# Patient Record
Sex: MALE | Race: WHITE | Employment: OTHER | URBAN - METROPOLITAN AREA
[De-identification: names, ages, dates, MRNs, and addresses within clinical notes are randomized per-mention and may not be internally consistent; named-entity substitution may affect disease eponyms.]

---

## 2018-02-05 PROBLEM — R07.89 CHEST TIGHTNESS: Status: ACTIVE | Noted: 2018-02-05

## 2019-03-07 ENCOUNTER — HOSPITAL ENCOUNTER (OUTPATIENT)
Age: 74
Setting detail: OBSERVATION
Discharge: HOME | End: 2019-03-07
Attending: EMERGENCY MEDICINE | Admitting: INTERNAL MEDICINE
Payer: MEDICARE

## 2019-03-07 ENCOUNTER — APPOINTMENT (OUTPATIENT)
Dept: GENERAL RADIOLOGY | Age: 74
End: 2019-03-07
Attending: EMERGENCY MEDICINE
Payer: MEDICARE

## 2019-03-07 VITALS
SYSTOLIC BLOOD PRESSURE: 132 MMHG | DIASTOLIC BLOOD PRESSURE: 67 MMHG | RESPIRATION RATE: 16 BRPM | HEIGHT: 71 IN | HEART RATE: 59 BPM | WEIGHT: 220 LBS | BODY MASS INDEX: 30.8 KG/M2 | OXYGEN SATURATION: 95 %

## 2019-03-07 DIAGNOSIS — R07.9 ACUTE CHEST PAIN: Primary | ICD-10-CM

## 2019-03-07 LAB
ALBUMIN SERPL-MCNC: 4 G/DL (ref 3.2–4.6)
ALBUMIN/GLOB SERPL: 1.1 {RATIO} (ref 1.2–3.5)
ALP SERPL-CCNC: 84 U/L (ref 50–136)
ALT SERPL-CCNC: 35 U/L (ref 12–65)
ANION GAP SERPL CALC-SCNC: 5 MMOL/L (ref 7–16)
AST SERPL-CCNC: 20 U/L (ref 15–37)
ATRIAL RATE: 59 BPM
BASOPHILS # BLD: 0 K/UL (ref 0–0.2)
BASOPHILS NFR BLD: 0 % (ref 0–2)
BILIRUB SERPL-MCNC: 1.8 MG/DL (ref 0.2–1.1)
BUN SERPL-MCNC: 14 MG/DL (ref 8–23)
CALCIUM SERPL-MCNC: 8.7 MG/DL (ref 8.3–10.4)
CALCULATED P AXIS, ECG09: 23 DEGREES
CALCULATED R AXIS, ECG10: -12 DEGREES
CALCULATED T AXIS, ECG11: 64 DEGREES
CHLORIDE SERPL-SCNC: 105 MMOL/L (ref 98–107)
CO2 SERPL-SCNC: 31 MMOL/L (ref 21–32)
CREAT SERPL-MCNC: 0.95 MG/DL (ref 0.8–1.5)
DIAGNOSIS, 93000: NORMAL
DIFFERENTIAL METHOD BLD: ABNORMAL
EOSINOPHIL # BLD: 0.2 K/UL (ref 0–0.8)
EOSINOPHIL NFR BLD: 3 % (ref 0.5–7.8)
ERYTHROCYTE [DISTWIDTH] IN BLOOD BY AUTOMATED COUNT: 12.1 % (ref 11.9–14.6)
GLOBULIN SER CALC-MCNC: 3.5 G/DL (ref 2.3–3.5)
GLUCOSE SERPL-MCNC: 116 MG/DL (ref 65–100)
HCT VFR BLD AUTO: 40.7 % (ref 41.1–50.3)
HGB BLD-MCNC: 14.5 G/DL (ref 13.6–17.2)
IMM GRANULOCYTES # BLD AUTO: 0 K/UL (ref 0–0.5)
IMM GRANULOCYTES NFR BLD AUTO: 0 % (ref 0–5)
LYMPHOCYTES # BLD: 1.1 K/UL (ref 0.5–4.6)
LYMPHOCYTES NFR BLD: 21 % (ref 13–44)
MCH RBC QN AUTO: 32.2 PG (ref 26.1–32.9)
MCHC RBC AUTO-ENTMCNC: 35.6 G/DL (ref 31.4–35)
MCV RBC AUTO: 90.4 FL (ref 79.6–97.8)
MONOCYTES # BLD: 0.5 K/UL (ref 0.1–1.3)
MONOCYTES NFR BLD: 10 % (ref 4–12)
NEUTS SEG # BLD: 3.5 K/UL (ref 1.7–8.2)
NEUTS SEG NFR BLD: 66 % (ref 43–78)
NRBC # BLD: 0 K/UL (ref 0–0.2)
P-R INTERVAL, ECG05: 140 MS
PLATELET # BLD AUTO: 148 K/UL (ref 150–450)
PMV BLD AUTO: 10.8 FL (ref 9.4–12.3)
POTASSIUM SERPL-SCNC: 3.5 MMOL/L (ref 3.5–5.1)
PROT SERPL-MCNC: 7.5 G/DL (ref 6.3–8.2)
Q-T INTERVAL, ECG07: 456 MS
QRS DURATION, ECG06: 98 MS
QTC CALCULATION (BEZET), ECG08: 451 MS
RBC # BLD AUTO: 4.5 M/UL (ref 4.23–5.6)
SODIUM SERPL-SCNC: 141 MMOL/L (ref 136–145)
TROPONIN I BLD-MCNC: 0.03 NG/ML (ref 0.02–0.05)
VENTRICULAR RATE, ECG03: 59 BPM
WBC # BLD AUTO: 5.4 K/UL (ref 4.3–11.1)

## 2019-03-07 PROCEDURE — 77030004534 HC CATH ANGI DX INFN CARD -A

## 2019-03-07 PROCEDURE — 93005 ELECTROCARDIOGRAM TRACING: CPT | Performed by: EMERGENCY MEDICINE

## 2019-03-07 PROCEDURE — 74011636320 HC RX REV CODE- 636/320: Performed by: INTERNAL MEDICINE

## 2019-03-07 PROCEDURE — 99218 HC RM OBSERVATION: CPT

## 2019-03-07 PROCEDURE — 84484 ASSAY OF TROPONIN QUANT: CPT

## 2019-03-07 PROCEDURE — 71045 X-RAY EXAM CHEST 1 VIEW: CPT

## 2019-03-07 PROCEDURE — 99285 EMERGENCY DEPT VISIT HI MDM: CPT | Performed by: EMERGENCY MEDICINE

## 2019-03-07 PROCEDURE — 74011250637 HC RX REV CODE- 250/637: Performed by: EMERGENCY MEDICINE

## 2019-03-07 PROCEDURE — 99152 MOD SED SAME PHYS/QHP 5/>YRS: CPT

## 2019-03-07 PROCEDURE — 85025 COMPLETE CBC W/AUTO DIFF WBC: CPT

## 2019-03-07 PROCEDURE — 74011250636 HC RX REV CODE- 250/636: Performed by: INTERNAL MEDICINE

## 2019-03-07 PROCEDURE — 74011250636 HC RX REV CODE- 250/636

## 2019-03-07 PROCEDURE — 93459 L HRT ART/GRFT ANGIO: CPT

## 2019-03-07 PROCEDURE — 93458 L HRT ARTERY/VENTRICLE ANGIO: CPT

## 2019-03-07 PROCEDURE — C1760 CLOSURE DEV, VASC: HCPCS

## 2019-03-07 PROCEDURE — C1769 GUIDE WIRE: HCPCS

## 2019-03-07 PROCEDURE — 80053 COMPREHEN METABOLIC PANEL: CPT

## 2019-03-07 PROCEDURE — C1894 INTRO/SHEATH, NON-LASER: HCPCS

## 2019-03-07 RX ORDER — ATORVASTATIN CALCIUM 40 MG/1
20 TABLET, FILM COATED ORAL DAILY
Status: DISCONTINUED | OUTPATIENT
Start: 2019-03-08 | End: 2019-03-07 | Stop reason: HOSPADM

## 2019-03-07 RX ORDER — SODIUM CHLORIDE 0.9 % (FLUSH) 0.9 %
5-40 SYRINGE (ML) INJECTION EVERY 8 HOURS
Status: DISCONTINUED | OUTPATIENT
Start: 2019-03-07 | End: 2019-03-07 | Stop reason: HOSPADM

## 2019-03-07 RX ORDER — SODIUM CHLORIDE 0.9 % (FLUSH) 0.9 %
5-40 SYRINGE (ML) INJECTION AS NEEDED
Status: DISCONTINUED | OUTPATIENT
Start: 2019-03-07 | End: 2019-03-07 | Stop reason: HOSPADM

## 2019-03-07 RX ORDER — MORPHINE SULFATE 2 MG/ML
2 INJECTION, SOLUTION INTRAMUSCULAR; INTRAVENOUS
Status: DISCONTINUED | OUTPATIENT
Start: 2019-03-07 | End: 2019-03-07 | Stop reason: HOSPADM

## 2019-03-07 RX ORDER — TRIAMTERENE AND HYDROCHLOROTHIAZIDE 37.5; 25 MG/1; MG/1
1 CAPSULE ORAL DAILY
Status: DISCONTINUED | OUTPATIENT
Start: 2019-03-08 | End: 2019-03-07 | Stop reason: HOSPADM

## 2019-03-07 RX ORDER — LIDOCAINE HYDROCHLORIDE 10 MG/ML
2-10 INJECTION INFILTRATION; PERINEURAL
Status: DISCONTINUED | OUTPATIENT
Start: 2019-03-07 | End: 2019-03-07 | Stop reason: HOSPADM

## 2019-03-07 RX ORDER — HEPARIN SODIUM 200 [USP'U]/100ML
2 INJECTION, SOLUTION INTRAVENOUS CONTINUOUS
Status: DISCONTINUED | OUTPATIENT
Start: 2019-03-07 | End: 2019-03-07 | Stop reason: HOSPADM

## 2019-03-07 RX ORDER — NITROGLYCERIN 0.4 MG/1
0.4 TABLET SUBLINGUAL
Status: DISCONTINUED | OUTPATIENT
Start: 2019-03-07 | End: 2019-03-07 | Stop reason: HOSPADM

## 2019-03-07 RX ORDER — GUAIFENESIN 100 MG/5ML
81 LIQUID (ML) ORAL DAILY
Status: DISCONTINUED | OUTPATIENT
Start: 2019-03-08 | End: 2019-03-07 | Stop reason: HOSPADM

## 2019-03-07 RX ORDER — LOSARTAN POTASSIUM 25 MG/1
25 TABLET ORAL DAILY
Status: DISCONTINUED | OUTPATIENT
Start: 2019-03-07 | End: 2019-03-07 | Stop reason: HOSPADM

## 2019-03-07 RX ORDER — MIDAZOLAM HYDROCHLORIDE 1 MG/ML
.5-2 INJECTION, SOLUTION INTRAMUSCULAR; INTRAVENOUS
Status: DISCONTINUED | OUTPATIENT
Start: 2019-03-07 | End: 2019-03-07 | Stop reason: HOSPADM

## 2019-03-07 RX ORDER — RANITIDINE 300 MG/1
300 TABLET ORAL DAILY
COMMUNITY
End: 2020-10-19

## 2019-03-07 RX ORDER — CARVEDILOL 12.5 MG/1
25 TABLET ORAL 2 TIMES DAILY
Status: DISCONTINUED | OUTPATIENT
Start: 2019-03-07 | End: 2019-03-07 | Stop reason: HOSPADM

## 2019-03-07 RX ORDER — ASPIRIN 325 MG
325 TABLET ORAL ONCE
Status: COMPLETED | OUTPATIENT
Start: 2019-03-07 | End: 2019-03-07

## 2019-03-07 RX ORDER — LOSARTAN POTASSIUM 25 MG/1
25 TABLET ORAL DAILY
Qty: 30 TAB | Refills: 11 | Status: SHIPPED | OUTPATIENT
Start: 2019-03-07 | End: 2019-11-07 | Stop reason: SDUPTHER

## 2019-03-07 RX ADMIN — ASPIRIN 325 MG ORAL TABLET 325 MG: 325 PILL ORAL at 08:04

## 2019-03-07 RX ADMIN — LIDOCAINE HYDROCHLORIDE 5 ML: 10 INJECTION, SOLUTION INFILTRATION; PERINEURAL at 12:24

## 2019-03-07 RX ADMIN — MIDAZOLAM HYDROCHLORIDE 2 MG: 1 INJECTION, SOLUTION INTRAMUSCULAR; INTRAVENOUS at 12:21

## 2019-03-07 RX ADMIN — IOPAMIDOL 90 ML: 755 INJECTION, SOLUTION INTRAVENOUS at 12:56

## 2019-03-07 RX ADMIN — HEPARIN SODIUM 2 ML/HR: 5000 INJECTION, SOLUTION INTRAVENOUS; SUBCUTANEOUS at 12:10

## 2019-03-07 RX ADMIN — NITROGLYCERIN 0.4 MG: 0.4 TABLET, ORALLY DISINTEGRATING SUBLINGUAL at 08:05

## 2019-03-07 RX ADMIN — MIDAZOLAM HYDROCHLORIDE 2 MG: 1 INJECTION, SOLUTION INTRAMUSCULAR; INTRAVENOUS at 12:27

## 2019-03-07 RX ADMIN — NITROGLYCERIN 0.4 MG: 0.4 TABLET, ORALLY DISINTEGRATING SUBLINGUAL at 08:13

## 2019-03-07 NOTE — PROGRESS NOTES
Discharge instructions reviewed with patient including post cath care. INT removed with cath intact. Right groin stable no bleeding or hematoma noted. Patient states verbal understanding of instructions.

## 2019-03-07 NOTE — ED PROVIDER NOTES
Patient complains of chest tightness intermittently for the past 6 days. Exertional and at rest. Had SOB associated at times. Left arm tingling at times. No nausea or vomiting. Hurting last night. Took one Nitroglycerin and his 81 mg aspirin. Slept fitfully with tightness intermittently through the night. CABG in 2011. Last cath 2/16 with patent grafts. Last stress test 2/18 was OK. [place]. Past Medical History:   Diagnosis Date    CAD (coronary artery disease)     diagnosed in 46s    Cancer ([facility] [address]     skin - right hand    Dyslipidemia     ED (erectile dysfunction)     GERD (gastroesophageal reflux disease)     Hypertension     ibs     Skin cancer 2010    basal cell       Past Surgical History:   Procedure Laterality Date    CABG, VEIN, TWO  4/5/2011    CARDIAC SURG PROCEDURE UNLIST      cabg x 3 april 5, 2011    HX APPENDECTOMY      HX CHOLECYSTECTOMY      HX HEART CATHETERIZATION           Family History:   Problem Relation Age of Onset    Heart Disease Mother     Heart Disease Maternal Uncle     Heart Disease Maternal Grandfather     Cancer Father        Social History     Socioeconomic History    Marital status:      Spouse name: Not on file    Number of children: Not on file    Years of education: Not on file    Highest education level: Not on file   Social Needs    Financial resource strain: Not on file    Food insecurity - worry: Not on file    Food insecurity - inability: Not on file   Carbonated Content needs - medical: Not on file   Carbonated Content needs - non-medical: Not on file   Occupational History    Not on file   Tobacco Use    Smoking status: Former Smoker     Years: 2.00     Types: Pipe    Smokeless tobacco: Never Used   Substance and Sexual Activity    Alcohol use:  Yes     Alcohol/week: 0.6 oz     Types: 1 Glasses of wine per week    Drug use: Yes     Types: Prescription    Sexual activity: Not on file   Other Topics Concern     Service Not Asked    Blood Transfusions Not Asked    Caffeine Concern Not Asked    Occupational Exposure Not Asked    Hobby Hazards Not Asked    Sleep Concern Not Asked    Stress Concern Not Asked    Weight Concern Yes    Special Diet Yes    Back Care Not Asked    Exercise Not Asked    Bike Helmet Not Asked   [address] Not Asked    Self-Exams Not Asked   Social History Narrative    Lives with his wife in [facility]    PCP- Dr. Valentina Dominguez in [place]         ALLERGIES: Ace inhibitors    Review of Systems   Constitutional: Negative. HENT: Negative. Eyes: Negative. Respiratory: Positive for shortness of breath. Cardiovascular: Positive for chest pain. Negative for palpitations and leg swelling. Gastrointestinal: Negative. Genitourinary: Negative. Musculoskeletal: Negative. Skin: Negative. Neurological: Negative. Hematological: Negative. Psychiatric/Behavioral: Negative. Vitals:    03/07/19 0758 03/07/19 0800 03/07/19 0805 03/07/19 0813   BP: 181/88 (!) 186/105 (!) 186/105 157/89   Pulse: (!) 57 (!) 59 (!) 58 (!) 58   SpO2: 95% 97%              Physical Exam   Constitutional: He is oriented to person, place, and time. He appears well-developed and well-nourished. HENT:   Head: Normocephalic and atraumatic. Right Ear: External ear normal.   Left Ear: External ear normal.   Mouth/Throat: Oropharynx is clear and moist.   Eyes: Conjunctivae and EOM are normal. Pupils are equal, round, and reactive to light. No scleral icterus. Neck: Normal range of motion. Neck supple. No JVD present. Cardiovascular: Normal rate, regular rhythm, normal heart sounds and intact distal pulses. Pulmonary/Chest: Effort normal and breath sounds normal.   Abdominal: Soft. Bowel sounds are normal. He exhibits no mass. There is no tenderness. Musculoskeletal: Normal range of motion. He exhibits no edema or tenderness. Neurological: He is alert and oriented to person, place, and time.    Skin: Skin is warm and dry. Psychiatric: He has a normal mood and affect. His behavior is normal.   Nursing note and vitals reviewed. MDM  Number of Diagnoses or Management Options  Acute chest pain:   Diagnosis management comments: Chest tightness  History of CAD  Nitroglycerin 0.4 SL x 2.  Aspirin 325 mg po - improved  EKG sinus no ischemia  CXR clear  Labs reviewed  Consult Cardiology - Dr. Verenice Nunn came to see patient       Amount and/or Complexity of Data Reviewed  Clinical lab tests: ordered and reviewed  Tests in the radiology section of CPT®: ordered and reviewed  Review and summarize past medical records: yes  Independent visualization of images, tracings, or specimens: yes    Critical Care  Total time providing critical care: 30-74 minutes (Critical care time 35 minutes)    Patient Progress  Patient progress: stable         Procedures

## 2019-03-07 NOTE — H&P
[facility] CARDIOLOGY History &Physical                 Primary Cardiologist: Dr Chuy Vásquez    Primary Care Physician: Lianet Kelly MD    Admitting Physician: Dr Reggie Bonds:     Patient is a 72 y.o. male with a hx of CAD s/p CABG in 2010, CA of the skin, HTN and and ED. The patient has been in good health with a [facility] in 2016 that showed stable disease. He has had couple weeks of increased chest tightness with activity that goes away with rest.  Associated symptoms include MAJOR, SOB, and tingling down the left arm. He denies fever, trauma, ha, loc, ams, n/v/d, recent illness. When the patient arrived in the ED he was found to be very hypertensive and his pain was relived with two nitro. He has no other complainants at this time and he is currently CP free. His 12 lead ekg showed no EKG changes with Trop I from POC testing at 0.03. The patient last meal as at 8 pm last night. The patient also notes that is taking less and less effort for him to notice his CP. Past Medical History:   Diagnosis Date    CAD (coronary artery disease)     diagnosed in 46s    Cancer ([facility] [address]     skin - right hand    Dyslipidemia     ED (erectile dysfunction)     GERD (gastroesophageal reflux disease)     Hypertension     ibs     Skin cancer 2010    basal cell      Past Surgical History:   Procedure Laterality Date    CABG, VEIN, TWO  4/5/2011    CARDIAC SURG PROCEDURE UNLIST      cabg x 3 april 5, 2011    HX APPENDECTOMY      HX CHOLECYSTECTOMY      HX HEART CATHETERIZATION        Allergies   Allergen Reactions    Ace Inhibitors Other (comments)     Angioedema     Social History     Tobacco Use    Smoking status: Former Smoker     Years: 2.00     Types: Pipe    Smokeless tobacco: Never Used   Substance Use Topics    Alcohol use:  Yes     Alcohol/week: 0.6 oz     Types: 1 Glasses of wine per week      FH:   Family History   Problem Relation Age of Onset    Heart Disease Mother     Heart Disease Maternal Uncle     Heart Disease Maternal Grandfather     Cancer Father         PAIGE  @SLY@      Objective:       Visit Vitals  /89   Pulse (!) 58   SpO2 97%       No intake/output data recorded. No intake/output data recorded. Physical Exam:  General: Well Developed, Well Nourished, No Acute Distress  HEENT: pupils equal and round, no abnormalities noted  Neck: supple, no JVD, no carotid bruits  Heart: S1S2 with RRR without murmurs or gallops  Lungs: Clear throughout auscultation bilaterally without adventitious sounds  Abd: soft, nontender, nondistended, with good bowel sounds  Ext: warm, no edema, calves supple/nontender, pulses 2+ bilaterally  Skin: warm and dry  Psychiatric: Normal mood and affect  Neurologic: Alert and oriented X 3      ECG: No ST segment changes    Data Review:   Recent Labs     03/07/19  0821 03/07/19  0741   NA  --  141   K  --  3.5   BUN  --  14   CREA  --  0.95   GLU  --  116*   WBC  --  5.4   HGB  --  14.5   HCT  --  40.7*   PLT  --  148*   TNIPOC 0.03  --          Echo Results  (Last 48 hours)    None          CXR Results  (Last 48 hours)               03/07/19 0807  XR CHEST PORT Final result    Impression:  IMPRESSION:    1. Mild cardiac megaly. Narrative:  CHEST RADIOGRAPH, 1 views, 3/7/2019       History: Chest tightness, arm tingling, and chest pain. Technique: Portable frontal view of the chest.        Comparison: Chest radiograph 4/6/2011       Findings:    Stable post surgical changes overlie the mediastinal silhouette. The heart   appears mildly enlarged on this AP study which is improved from the prior study. Lungs are expanded without appreciable pneumothorax. No evolving consolidation,   or pleural effusion is seen. Assessment/Plan:   Principal Problem:    Unstable angina ([address] (3/28/2011)  Pt is NPO with significant hx and worsening symptoms.   Plan for LHC, Monitor, Trend Trop I, BMP and CBC in the am.  Continue ASA, Statin, and BB, Add ARB with HTN. Further recommendations pending clinical course.       Active Problems:    HTN (hypertension) (3/28/2011) Continue BB, and Duartetic, will add ARB      Dyslipidemia (3/28/2011) Continue Statin      S/P CABG x 2 (4/5/2011) see above      Chest pain (3/7/2019) see above            Braulio Lopez, NP  3/7/2019  10:36 AM

## 2019-03-07 NOTE — DISCHARGE INSTRUCTIONS
HEART CATHETERIZATION/ANGIOGRAPHY DISCHARGE INSTRUCTIONS    1. Check puncture site frequently for swelling or bleeding. If there is any bleeding, lie down and apply pressure over the area with a clean towel or washcloth. Notify your doctor for any redness, swelling, drainage, or oozing from the puncture site. Notify your doctor for any fever or chills. 2. If the extremity becomes cold, numb, or painful call [facility] Cardiology at [phone]  3. Activity should be limited for the next 48 hours. Climb stairs as little as possible and avoid any stooping, bending, or strenuous activity for 48 hours. No heavy lifting (anything over 10 pounds) for 3 days. 4. You may resume your usual diet. Drink more fluids than usual.  5. Have a responsible person drive you home and stay with you for at least 24 hours after your heart catheterization/angiography. 6. You may remove bandage from your {ARM/GROIN:24120} in 24 hours. You may shower in 24 hours. No tub baths, hot tubs, or swimming for 1 week. Do not place any lotions, creams, powders, or ointments over puncture site for 1 week. You may place a clean band-aid over the puncture site each day for 5 days. Change daily. I have read the above instructions and have had the opportunity to ask questions.       Patient: ________________________   Date: 3/7/2019    Witness: _______________________   Date: 3/7/2019

## 2019-03-07 NOTE — PROGRESS NOTES
TRANSFER - OUT REPORT:    Verbal report given to Michelle RN(name) on Wilder Cano  being transferred to CPRU(unit) for routine progression of care       Report consisted of patients Situation, Background, Assessment and   Recommendations(SBAR). Information from the following report(s) Procedure Summary, MAR and Cardiac Rhythm SB was reviewed with the receiving nurse. Lines:   Peripheral IV 03/07/19 Left Antecubital (Active)       Peripheral IV 03/07/19 Right Antecubital (Active)        Opportunity for questions and clarification was provided.       Patient transported with:   Registered Nurse     [facility] Dr Daniel Mendenhall only  Right femoral access- 6fr angioseal - site C/D/I  Versed 4mg IV

## 2019-03-07 NOTE — DISCHARGE SUMMARY
[facility] Cardiology Discharge Summary     Patient ID:  Los Walls  [id number]  89 y.o.  1945    Admit date: 3/7/2019    Discharge date:  03/07/19    Admitting Physician: Héctor Michael MD     Discharge Physician: Margarita Allen NP/Dr. Ana Santacruz    Admission Diagnoses: Chest pain [R07.9]    Discharge Diagnoses:   Patient Active Problem List    Diagnosis Date Noted    Chest pain 03/07/2019    Chest tightness 02/05/2018    S/P CABG x 2 04/05/2011    Unstable angina ([address] 03/28/2011    CAD (coronary artery disease), native coronary artery 03/28/2011    HTN (hypertension) 03/28/2011    Dyslipidemia 03/28/2011    ED (erectile dysfunction) 03/28/2011    IBS (irritable bowel syndrome) 03/28/2011    GERD (gastroesophageal reflux disease) 03/28/2011    Skin cancer 03/28/2011       Cardiology Procedures this admission:  Diagnostic left heart catheterization  Consults: None    Hospital Course: The patient came to the ED with couple weeks of increased chest tightness with activity that goes away with rest.  Associated symptoms include MAJOR, SOB, and tingling down the left arm. He denies fever, trauma, ha, loc, ams, n/v/d, recent illness. When the patient arrived in the ED he was found to be very hypertensive and his pain was relived with two nitro. He was subsequently scheduled for a LHC at [facility] on 03/07/19. Patient underwent cardiac catheterization by Dr. Ana Santacruz and was found to have stable CAD with recommendations for medical management. Patient tolerated the procedure well and was taken to the CPRU for recovery. The day of discharge the patient was up feeling well without any complaints of chest pain or shortness of breath. Patient's right femoral cath site was clean, dry and intact without hematoma or bruit after the use of an angioseal device. Patient's labs were WNL. Patient was seen and examined by Dr. Ana Santacruz and determined stable and ready for discharge.  Patient was instructed on the importance of medication compliance everyday without missing a dose. For maximized medical therapy for CAD, patient will continue BB, ACE-I, and statin as well. The patient will follow up with [facility] Cardiology Dr. Doyne Duane and has been referred to cardiac rehab. DISPOSITION: The patient is being discharged home in stable condition on a low saturated fat, low cholesterol and low salt diet. The patient is instructed to advance activities as tolerated to the limit of fatigue or shortness of breath. The patient is instructed to avoid all heavy lifting, straining, stooping or squatting for 3-5 days. The patient is instructed to watch the cath site for bleeding/oozing; if seen, the patient is instructed to apply firm pressure with a clean cloth and call [facility] Cardiology at [phone]. The patient is instructed to watch for signs of infection which include: increasing area of redness, fever/hot to touch or purulent drainage at the catheterization site. The patient is instructed not to soak in a bathtub for 7-10 days, but is cleared to shower. The patient is instructed to call the office or return to the ER for immediate evaluation for any shortness of breath or chest pain not relieved by NTG. Discharge Exam:   Visit Vitals  /76   Pulse 61   Resp 16   Ht 5' 10.5\" (1.791 m)   Wt 99.8 kg (220 lb)   SpO2 94%   BMI 31.12 kg/m²     Patient has been seen by Dr. Carlos Alberto Vargas: see his progress note for exam details.     Recent Results (from the past 24 hour(s))   EKG, 12 LEAD, INITIAL    Collection Time: 03/07/19  7:30 AM   Result Value Ref Range    Ventricular Rate 59 BPM    Atrial Rate 59 BPM    P-R Interval 140 ms    QRS Duration 98 ms    Q-T Interval 456 ms    QTC Calculation (Bezet) 451 ms    Calculated P Axis 23 degrees    Calculated R Axis -12 degrees    Calculated T Axis 64 degrees    Diagnosis       Sinus bradycardia  Nonspecific T wave abnormality  Abnormal ECG  When compared with ECG of 06-APR-2011 06:38,  ST no longer elevated in Lateral leads  Confirmed by José Miguel Johnson ([id number]) on 3/7/2019 7:50:02 AM     CBC WITH AUTOMATED DIFF    Collection Time: 03/07/19  7:41 AM   Result Value Ref Range    WBC 5.4 4.3 - 11.1 K/uL    RBC 4.50 4.23 - 5.6 M/uL    HGB 14.5 13.6 - 17.2 g/dL    HCT 40.7 (L) 41.1 - 50.3 %    MCV 90.4 79.6 - 97.8 FL    MCH 32.2 26.1 - 32.9 PG    MCHC 35.6 (H) 31.4 - 35.0 g/dL    RDW 12.1 11.9 - 14.6 %    PLATELET 158 (L) 593 - 450 K/uL    MPV 10.8 9.4 - 12.3 FL    ABSOLUTE NRBC 0.00 0.0 - 0.2 K/uL    DF AUTOMATED      NEUTROPHILS 66 43 - 78 %    LYMPHOCYTES 21 13 - 44 %    MONOCYTES 10 4.0 - 12.0 %    EOSINOPHILS 3 0.5 - 7.8 %    BASOPHILS 0 0.0 - 2.0 %    IMMATURE GRANULOCYTES 0 0.0 - 5.0 %    ABS. NEUTROPHILS 3.5 1.7 - 8.2 K/UL    ABS. LYMPHOCYTES 1.1 0.5 - 4.6 K/UL    ABS. MONOCYTES 0.5 0.1 - 1.3 K/UL    ABS. EOSINOPHILS 0.2 0.0 - 0.8 K/UL    ABS. BASOPHILS 0.0 0.0 - 0.2 K/UL    ABS. IMM. GRANS. 0.0 0.0 - 0.5 K/UL   METABOLIC PANEL, COMPREHENSIVE    Collection Time: 03/07/19  7:41 AM   Result Value Ref Range    Sodium 141 136 - 145 mmol/L    Potassium 3.5 3.5 - 5.1 mmol/L    Chloride 105 98 - 107 mmol/L    CO2 31 21 - 32 mmol/L    Anion gap 5 (L) 7 - 16 mmol/L    Glucose 116 (H) 65 - 100 mg/dL    BUN 14 8 - 23 MG/DL    Creatinine 0.95 0.8 - 1.5 MG/DL    GFR est AA >60 >60 ml/min/1.73m2    GFR est non-AA >60 >60 ml/min/1.73m2    Calcium 8.7 8.3 - 10.4 MG/DL    Bilirubin, total 1.8 (H) 0.2 - 1.1 MG/DL    ALT (SGPT) 35 12 - 65 U/L    AST (SGOT) 20 15 - 37 U/L    Alk.  phosphatase 84 50 - 136 U/L    Protein, total 7.5 6.3 - 8.2 g/dL    Albumin 4.0 3.2 - 4.6 g/dL    Globulin 3.5 2.3 - 3.5 g/dL    A-G Ratio 1.1 (L) 1.2 - 3.5     POC TROPONIN-I    Collection Time: 03/07/19  8:21 AM   Result Value Ref Range    Troponin-I (POC) 0.03 0.02 - 0.05 ng/ml         Patient Instructions:     Current Discharge Medication List      START taking these medications    Details   losartan (COZAAR) 25 mg tablet Take 1 Tab by mouth daily. Qty: 30 Tab, Refills: 11         CONTINUE these medications which have NOT CHANGED    Details   raNITIdine (ZANTAC) 300 mg tab Take 300 mg by mouth daily. nitroglycerin (NITROSTAT) 0.4 mg SL tablet 1 Tab by SubLINGual route every five (5) minutes as needed for Chest Pain. Qty: 25 Tab, Refills: 11      triamterene-hydrochlorothiazide (DYAZIDE) 37.5-25 mg per capsule Take 1 Cap by mouth daily. carvedilol (COREG) 25 mg tablet Take 25 mg by mouth two (2) times a day. atorvastatin (LIPITOR) 40 mg tablet Take 20 mg by mouth daily. Indications: am      MULTIVITAMINS W-MINERALS/LUT (CENTRUM SILVER PO) Take 1 Tab by mouth daily. Indications: last dose 4/1/11      aspirin 81 mg chewable tablet Take 81 mg by mouth daily.  Indications: am               Signed:  WU Valdivia  3/7/2019  1:07 PM

## 2019-03-07 NOTE — PROCEDURES
Cardiac Catheterization Procedure Note    Patient ID:     Name: Marylu Luke   Medical Record Number: [id number]   YOB: 1945    Date of Procedure: 3/7/2019     Pre-procedure Diagnosis:  Typical Angina    Post-procedure Diagnosis: Coronary Artery Disease    Reason for Procedure: New Onset Angina < or = 2 Months    Blood loss less than 5 ml    Sedation. Pt received 2 mg versed and 0 mcg fentanyl for monitored conscious sedation from 1230to 100. Nurse daniel    Specimen: None    No complications    No assistants    Time out, Mallampati, and ASA performed    Procedure:  After informed consent, patient was prepped and draped in the usual sterile fashion. femoral approach was used. 100cc Visipaque contrast were utilized for the entire procedure.  angioseal closure device used        FINDINGS    Left Ventricle: 65  LVEDP: 15    Left Main:ok    Left Anterior descending coronary artery: occluded       Left Circumflex coronary artery: patent 2 small om vessels        Right coronary artery: patent            Graft anatomy: trotter to lad patent  Sv to ramus patent  Sv to om3 patent    Intervention if done: na    Conclusions: stable cad    Recommentations: med tx    No complications      Signed By: Arleen Amaral MD

## 2019-03-07 NOTE — ED TRIAGE NOTES
Patient reports chest tightness and tingling in the left arm x 3 days. Patient states that he had some restless sleep and took 1 SL nitro at 1 am with some relief. Hx of bypass surgery 10 years ago. Patient of [facility] cardiology.

## 2019-03-08 NOTE — PROCEDURES
[facility]  CARDIAC CATH    Name:  Bhargav White  MR#:  [id number]  :  1945  ACCOUNT #:  [de-identified]  DATE OF SERVICE:  2019      PROCEDURE PERFORMED:  Left heart catheterization, selective coronary angiography, left ventriculogram, LIMA angiography x1, saphenous vein angiography x2. INDICATION:  New onset angina. COMPLICATIONS:  None. ACCESS:  Right femoral.    ANESTHESIA:  4 mg of Versed were given from 12:30 to 1 o'clock by nurse Natalia    CATHETERS:  Tima left 4, Tima right 4, pigtail and LIMA catheter were used. FINDINGS:  Are as follows. Left ventriculogram done in [place]GLESIAS projection shows EF of 65%. No gradient on pullback. LVDP of 15. Left main arises normally, bifurcates into LAD and circumflex. Left main has no significant disease. LAD is occluded off of the left main. AV groove circ is patent with two small proximal OMs that are patent. There is no significant disease in the AV groove circ. There is an OM3 that is occluded and this fills from a patent graft. Right coronary artery is a large dominant artery coursing in the AV groove supplying a large PDA and a small CARLOS ALBERTO. The right has no disease. LIMA to the LAD is widely patent with good insertion and good runoff into the LAD. Saphenous vein bypass graft to high ramus is patent with good runoff into a bifurcating ramus. There is no disease in this graft. Saphenous vein bypass graft to an OM is patent with good proximal and distal anastomosis and good runoff into the OM system. CONCLUSION:  Stable coronary anatomy with continued patency of 3/3 grafts with normal LV function. Continue medical therapy. The patient is angio-sealed without difficulty.               MD ROLAND [email or web address]  D:  2019 13:06  T:  2019 4:57  JOB #:  [id number]

## 2019-04-15 NOTE — PROGRESS NOTES
Initial visit to assess pt's spiritual needs. Feeling today? Well, hopes to discharge to home today    Receiving good care?  yes    Needs from Spiritual Care:  None    Ministry of presence & prayer to demonstrate caring & concern, convey emotional & spiritual support.     acosta Angeles MDiv,[facility],PhD NEW PATIENT    Patient: Mitzi John  : 1954    Primary Care Provider: Shadi Roger MD    Requesting Provider: As above    Pain of the Right Foot      History      Chief Complaint: Forefoot pain, right greater than left    History of Present Illness: This is an extremely pleasant 65-year-old woman who I have seen twice in the past, most recently .  She has bunions.  She has had some numbness over the toes especially the dorsal medial great toe on the right when she wears shoes.  Since I last saw her she has subsequently retired.  She reports that she has a lot less pain and numbness now that she is not working.  She has gone to wider comfortable shoes.  She has had some pain under the metatarsal heads on the right, she does not currently have any custom orthotics.  She has developed a mild right second hammertoe.  She is wondering if surgery is indicated.  She did not really remember our discussion about what bunions involved.  She did not remember that you have to cut the bone etc.  She is very active.  She reports that in general her pain is very low level 1 or 2 out of 10, sometimes if she hits the toe the pain is 7 or 8 out of 10.  When I last saw her I had recommended some support stockings, she reports she never got them.  She does have some venous stasis and I would recommend support stockings daily.    Current Outpatient Medications on File Prior to Visit   Medication Sig Dispense Refill   • clotrimazole-betamethasone (LOTRISONE) 1-0.05 % cream Apply  topically to the appropriate area as directed Every 12 (Twelve) Hours. 15 g 0   • estradiol (ESTRACE VAGINAL) 0.1 MG/GM vaginal cream Insert 1 g into the vagina 2 (Two) Times a Week. 1 each 12   • levothyroxine (SYNTHROID, LEVOTHROID) 75 MCG tablet TAKE ONE TABLET BY MOUTH ONCE DAILY 30 tablet 11   • nabumetone (RELAFEN) 500 MG tablet Take 1 tablet by mouth 2 (Two) Times a Day As Needed for Mild Pain . 20 tablet 0   • omeprazole  "(priLOSEC) 20 MG capsule TAKE ONE CAPSULE BY MOUTH ONCE DAILY 30 capsule 11     No current facility-administered medications on file prior to visit.       No Known Allergies   Past Medical History:   Diagnosis Date   • Cataract extraction status of eye, right    • GERD (gastroesophageal reflux disease)    • Hypertension      Past Surgical History:   Procedure Laterality Date   • BREAST BIOPSY Left 2006?    CORE BX?, BENIGN   • CATARACT EXTRACTION      R. eye     Family History   Problem Relation Age of Onset   • Arthritis Mother    • Hypertension Mother    • Arthritis Father    • Breast cancer Neg Hx    • Ovarian cancer Neg Hx       Social History     Socioeconomic History   • Marital status:      Spouse name: Not on file   • Number of children: Not on file   • Years of education: Not on file   • Highest education level: Not on file   Tobacco Use   • Smoking status: Never Smoker   • Smokeless tobacco: Never Used   Substance and Sexual Activity   • Alcohol use: No   • Drug use: No        Review of Systems   Constitutional: Negative.    HENT: Negative.    Eyes: Negative.    Respiratory: Negative.    Cardiovascular: Negative.    Gastrointestinal: Negative.    Endocrine: Negative.    Genitourinary: Negative.    Musculoskeletal: Positive for arthralgias.   Skin: Negative.    Allergic/Immunologic: Negative.    Neurological: Negative.    Hematological: Negative.    Psychiatric/Behavioral: Negative.        The following portions of the patient's history were reviewed and updated as appropriate: allergies, current medications, past family history, past medical history, past social history, past surgical history and problem list.    Physical Exam:   Pulse 82   Ht 170 cm (66.93\")   Wt 76.4 kg (168 lb 6.9 oz)   SpO2 98%   BMI 26.44 kg/m²   GENERAL: Body habitus: overweight    Lower extremity edema: Right: trace; Leftt: trace    Varicose veins:  Right: mild; Left: mild    Gait: normal     Mental Status:  awake and " alert; oriented to person, place, and time    Voice:  clear  SKIN:  warm and dry    Hair Growth:  Right:normal; Left:  normal  NAILS: Toenails: normal  HEENT: Head: Normocephalic, atraumatic,  without obvious abnormality.  eye: normal external eye, no icterus  ears: normal external ears  nose: normal external nose  pharynx: dental hygiene adequate  PULM:  Repiratory effort normal  CV:  Dorsalis Pedis:  Right: 2+; Left:2+    Posterior Tibial: Right:2+; Left:2+    Capillary Refill:  Brisk  MSK:  Hand:right handed and mild arthritis, Heberden's nodes      Tibia:  Right:  non tender; Left:  non tender      Ankle:  Right: non tender, ROM  normal and symmetric and motor function  normal; Left:  non tender, ROM  normal and symmetric and motor function  normal      Foot:  Right:  No tenderness over the bunion, fairly prominent hallux valgus metatarsus primus varus, no pain with a grind test, small callus under the second metatarsal head, mildly tender under the second metatarsal head, no swelling in the joint, mild second hammertoe, no other tenderness; Left:  Milder hallux valgus metatarsus primus varus, no tenderness, small callus under the second metatarsal head no tenderness      NEURO: Heel Walking:  Right:  normal; Left:  normal    Toe Walking:  Right:  normal; Left:  normal     [place] 5.07 monofilament test: normal    Lower extremity sensation: intact with some numbness over the dorsal medial right great toe    Reflexes:  Biceps:  Right:  1+; Left:  1+           Quads:  Right:  2+; Left:  2+           Ankle:  Right:  1+; Left:  1+      Calf Atrophy:none    Motor Function: all 5/5         Medical Decision Making    Data Review:   ordered and reviewed x-rays today and I reviewed my prior records, it has been greater than 5 years since I last saw her    Assessment and Plan/ Diagnosis/Treatment options:   1.  Acquired metatarsus varus of right foot  She does have hallux valgus metatarsus primus varus and  metatarsalgia under the second metatarsal head.  I again went over the findings in detail.  I drew a picture on her foot, I explained widening of the intermetatarsal etc.  She has a 16 degree intermetatarsal angle, if it came to surgery I would need to do a crescentic and Merrill osteotomy.  For the second toe I would excise the PIP joint, make the toes stiff, lengthening and transfer tendons.  That is a major surgery with a long recovery time.  She would be 6-8 weeks nonweightbearing, then 6-8 weeks in a walking boot.  I explained swelling generally persists for about a year.  She does not think that she has enough pain for that, and I agree.  Now that she is retired she hopefully can wear better shoes.  I also think custom orthotics will help the metatarsalgia.  I also showed her how to tape the second hammertoe or use a splint to prevent it from getting worse.  I went over everything in detail.  I be happy to see her again anytime    3. Acquired metatarsus varus of left foot  As above left foot is milder    4. Venous stasis  She really should wear support stockings daily, the swelling increases the tightness of the shoes which will increase the numbness that she has when she wears shoes I explained the numbness is common due to pressure of the shoe over the digital nerve over the bunion

## 2020-10-15 ENCOUNTER — HOSPITAL ENCOUNTER (EMERGENCY)
Age: 75
Discharge: HOME | End: 2020-10-15
Attending: EMERGENCY MEDICINE
Payer: COMMERCIAL

## 2020-10-15 ENCOUNTER — APPOINTMENT (OUTPATIENT)
Dept: GENERAL RADIOLOGY | Age: 75
End: 2020-10-15
Attending: EMERGENCY MEDICINE
Payer: COMMERCIAL

## 2020-10-15 VITALS
BODY MASS INDEX: 29.96 KG/M2 | DIASTOLIC BLOOD PRESSURE: 71 MMHG | HEART RATE: 55 BPM | RESPIRATION RATE: 18 BRPM | WEIGHT: 214 LBS | OXYGEN SATURATION: 94 % | SYSTOLIC BLOOD PRESSURE: 149 MMHG | HEIGHT: 71 IN | TEMPERATURE: 98.5 F

## 2020-10-15 DIAGNOSIS — R07.9 CHEST PAIN, UNSPECIFIED TYPE: Primary | ICD-10-CM

## 2020-10-15 LAB
ALBUMIN SERPL-MCNC: 3.9 G/DL (ref 3.2–4.6)
ALBUMIN/GLOB SERPL: 1.2 {RATIO} (ref 1.2–3.5)
ALP SERPL-CCNC: 85 U/L (ref 50–136)
ALT SERPL-CCNC: 31 U/L (ref 12–65)
ANION GAP SERPL CALC-SCNC: 6 MMOL/L (ref 7–16)
AST SERPL-CCNC: 21 U/L (ref 15–37)
ATRIAL RATE: 57 BPM
BASOPHILS # BLD: 0 K/UL (ref 0–0.2)
BASOPHILS NFR BLD: 0 % (ref 0–2)
BILIRUB SERPL-MCNC: 1.9 MG/DL (ref 0.2–1.1)
BUN SERPL-MCNC: 13 MG/DL (ref 8–23)
CALCIUM SERPL-MCNC: 9 MG/DL (ref 8.3–10.4)
CALCULATED P AXIS, ECG09: 4 DEGREES
CALCULATED R AXIS, ECG10: -15 DEGREES
CALCULATED T AXIS, ECG11: -25 DEGREES
CHLORIDE SERPL-SCNC: 104 MMOL/L (ref 98–107)
CO2 SERPL-SCNC: 30 MMOL/L (ref 21–32)
CREAT SERPL-MCNC: 1.17 MG/DL (ref 0.8–1.5)
DIAGNOSIS, 93000: NORMAL
DIFFERENTIAL METHOD BLD: ABNORMAL
EOSINOPHIL # BLD: 0.1 K/UL (ref 0–0.8)
EOSINOPHIL NFR BLD: 3 % (ref 0.5–7.8)
ERYTHROCYTE [DISTWIDTH] IN BLOOD BY AUTOMATED COUNT: 11.9 % (ref 11.9–14.6)
GLOBULIN SER CALC-MCNC: 3.3 G/DL (ref 2.3–3.5)
GLUCOSE SERPL-MCNC: 99 MG/DL (ref 65–100)
HCT VFR BLD AUTO: 39.4 % (ref 41.1–50.3)
HGB BLD-MCNC: 13.8 G/DL (ref 13.6–17.2)
IMM GRANULOCYTES # BLD AUTO: 0 K/UL (ref 0–0.5)
IMM GRANULOCYTES NFR BLD AUTO: 0 % (ref 0–5)
LYMPHOCYTES # BLD: 1 K/UL (ref 0.5–4.6)
LYMPHOCYTES NFR BLD: 18 % (ref 13–44)
MCH RBC QN AUTO: 32 PG (ref 26.1–32.9)
MCHC RBC AUTO-ENTMCNC: 35 G/DL (ref 31.4–35)
MCV RBC AUTO: 91.4 FL (ref 79.6–97.8)
MONOCYTES # BLD: 0.5 K/UL (ref 0.1–1.3)
MONOCYTES NFR BLD: 9 % (ref 4–12)
NEUTS SEG # BLD: 3.8 K/UL (ref 1.7–8.2)
NEUTS SEG NFR BLD: 70 % (ref 43–78)
NRBC # BLD: 0 K/UL (ref 0–0.2)
P-R INTERVAL, ECG05: 176 MS
PLATELET # BLD AUTO: 138 K/UL (ref 150–450)
PMV BLD AUTO: 10.3 FL (ref 9.4–12.3)
POTASSIUM SERPL-SCNC: 3.8 MMOL/L (ref 3.5–5.1)
PROT SERPL-MCNC: 7.2 G/DL (ref 6.3–8.2)
Q-T INTERVAL, ECG07: 426 MS
QRS DURATION, ECG06: 92 MS
QTC CALCULATION (BEZET), ECG08: 414 MS
RBC # BLD AUTO: 4.31 M/UL (ref 4.23–5.6)
SODIUM SERPL-SCNC: 140 MMOL/L (ref 136–145)
TROPONIN-HIGH SENSITIVITY: 7.5 PG/ML (ref 0–14)
TROPONIN-HIGH SENSITIVITY: 7.5 PG/ML (ref 0–14)
VENTRICULAR RATE, ECG03: 57 BPM
WBC # BLD AUTO: 5.5 K/UL (ref 4.3–11.1)

## 2020-10-15 PROCEDURE — 80053 COMPREHEN METABOLIC PANEL: CPT

## 2020-10-15 PROCEDURE — 99284 EMERGENCY DEPT VISIT MOD MDM: CPT

## 2020-10-15 PROCEDURE — 71045 X-RAY EXAM CHEST 1 VIEW: CPT

## 2020-10-15 PROCEDURE — 84484 ASSAY OF TROPONIN QUANT: CPT

## 2020-10-15 PROCEDURE — 93005 ELECTROCARDIOGRAM TRACING: CPT | Performed by: EMERGENCY MEDICINE

## 2020-10-15 PROCEDURE — 85025 COMPLETE CBC W/AUTO DIFF WBC: CPT

## 2020-10-15 PROCEDURE — 74011250637 HC RX REV CODE- 250/637: Performed by: EMERGENCY MEDICINE

## 2020-10-15 RX ORDER — GUAIFENESIN 100 MG/5ML
324 LIQUID (ML) ORAL
Status: COMPLETED | OUTPATIENT
Start: 2020-10-15 | End: 2020-10-15

## 2020-10-15 RX ADMIN — ASPIRIN 324 MG: 81 TABLET, CHEWABLE ORAL at 11:51

## 2020-10-15 NOTE — ED NOTES
I have reviewed discharge instructions with the patient. The patient verbalized understanding. Patient left ED via Discharge Method: ambulatory to Home with family. Opportunity for questions and clarification provided. Patient given 0 scripts. To continue your aftercare when you leave the hospital, you may receive an automated call from our care team to check in on how you are doing. This is a free service and part of our promise to provide the best care and service to meet your aftercare needs.  If you have questions, or wish to unsubscribe from this service please call [phone]. Thank you for Choosing our [facility] Emergency Department.

## 2020-10-15 NOTE — ED PROVIDER NOTES
61-year-old male with a history of coronary artery disease and triple bypass surgery in 2011, high cholesterol, GERD, hypertension presents with intermittent episodes of left-sided chest heaviness for the past week. Symptoms are worse with exertion, but occasionally occur at rest.  He denies any associated shortness of breath or nausea. He has had a few episodes of sweating. No documented fevers. Denies cough or known exposure COVID. Last cath 2 years ago was normal. He took nitro last night with resolution of pain. Chest Pain (Angina)    Associated symptoms include diaphoresis. Pertinent negatives include no abdominal pain, no back pain, no cough, no fever, no headaches, no nausea, no palpitations, no shortness of breath, no vomiting and no weakness.         Past Medical History:   Diagnosis Date    CAD (coronary artery disease)     diagnosed in 46s    Cancer ([facility] [address]     skin - right hand    Dyslipidemia     ED (erectile dysfunction)     GERD (gastroesophageal reflux disease)     Hypertension     ibs     Skin cancer 2010    basal cell       Past Surgical History:   Procedure Laterality Date    CABG, VEIN, TWO  4/5/2011    CARDIAC SURG PROCEDURE UNLIST      cabg x 3 april 5, 2011    HX APPENDECTOMY      HX CHOLECYSTECTOMY      HX HEART CATHETERIZATION           Family History:   Problem Relation Age of Onset    Heart Disease Mother     Heart Disease Maternal Uncle     Heart Disease Maternal Grandfather     Cancer Father        Social History     Socioeconomic History    Marital status:      Spouse name: Not on file    Number of children: Not on file    Years of education: Not on file    Highest education level: Not on file   Occupational History    Not on file   Social Needs    Financial resource strain: Not on file    Food insecurity     Worry: Not on file     Inability: Not on file    Transportation needs     Medical: Not on file     Non-medical: Not on file   Tobacco Use  Smoking status: Former Smoker     Years: 2.00     Types: Pipe     Last attempt to quit:      Years since quittin.8    Smokeless tobacco: Never Used   Substance and Sexual Activity    Alcohol use: Yes     Alcohol/week: 1.0 standard drinks     Types: 1 Glasses of wine per week    Drug use: Yes     Types: Prescription    Sexual activity: Not on file   Lifestyle    Physical activity     Days per week: Not on file     Minutes per session: Not on file    Stress: Not on file   Relationships    Social connections     Talks on phone: Not on file     Gets together: Not on file     Attends Sabianism service: Not on file     Active member of club or organization: Not on file     Attends meetings of clubs or organizations: Not on file     Relationship status: Not on file    Intimate partner violence     Fear of current or ex partner: Not on file     Emotionally abused: Not on file     Physically abused: Not on file     Forced sexual activity: Not on file   Other Topics Concern     Service Not Asked    Blood Transfusions Not Asked    Caffeine Concern Not Asked    Occupational Exposure Not Asked   Earna Shawl Hazards Not Asked    Sleep Concern Not Asked    Stress Concern Not Asked    Weight Concern Yes    Special Diet Yes    Back Care Not Asked    Exercise Not Asked    Bike Helmet Not Asked    Seat Belt Not Asked    Self-Exams Not Asked   Social History Narrative    Lives with his wife in [facility]    PCP- Dr. Sue  in [address] [facility]: Ace inhibitors    Review of Systems   Constitutional: Positive for diaphoresis. Negative for chills and fever. HENT: Negative for hearing loss. Eyes: Negative for visual disturbance. Respiratory: Negative for cough and shortness of breath. Cardiovascular: Positive for chest pain. Negative for palpitations. Gastrointestinal: Negative for abdominal pain, diarrhea, nausea and vomiting. Musculoskeletal: Negative for back pain.    Skin: Negative for rash. Neurological: Negative for weakness and headaches. Psychiatric/Behavioral: Negative for confusion. There were no vitals filed for this visit. Physical Exam  Vitals signs and nursing note reviewed. Constitutional:       Appearance: He is well-developed. HENT:      Head: Normocephalic and atraumatic. Eyes:      Pupils: Pupils are equal, round, and reactive to light. Neck:      Musculoskeletal: Normal range of motion and neck supple. Cardiovascular:      Rate and Rhythm: Regular rhythm. Heart sounds: Normal heart sounds. Pulmonary:      Effort: Pulmonary effort is normal.      Breath sounds: Normal breath sounds. Abdominal:      Palpations: Abdomen is soft. Tenderness: There is no abdominal tenderness. Musculoskeletal: Normal range of motion. Skin:     General: Skin is warm and dry. Neurological:      General: No focal deficit present. Mental Status: He is alert. Psychiatric:         Behavior: Behavior normal.          MDM  Number of Diagnoses or Management Options  Diagnosis management comments: Parts of this document were created using dragon voice recognition software. The chart has been reviewed but errors may still be present. I wore appropriate PPE throughout this patient's ED visit. Robert Gonzalez MD, 11:33 AM    3:24 PM  2 neg trops. Advised cardiology follow up     I discussed the results of all labs, procedures, radiographs, and treatments with the patient and available family. Treatment plan is agreed upon and the patient is ready for discharge. Questions about treatment in the ED and differential diagnosis of presenting condition were answered. Patient was given verbal discharge instructions including, but not limited to, importance of returning to the emergency department for any concern of worsening or continued symptoms. Instructions were given to follow up with a primary care provider or specialist within 1-2 days.   Adverse effects of medications, if prescribed, were discussed and patient was advised to refrain from significant physical activity until followed up by primary care physician and to not drive or operate heavy machinery after taking any sedating substances. Amount and/or Complexity of Data Reviewed  Clinical lab tests: reviewed and ordered (Results for orders placed or performed during the hospital encounter of 10/15/20  -CBC WITH AUTOMATED DIFF       Result                      Value             Ref Range           WBC                         5.5               4.3 - 11.1 K*       RBC                         4.31              4.23 - 5.6 M*       HGB                         13.8              13.6 - 17.2 *       HCT                         39.4 (L)          41.1 - 50.3 %       MCV                         91.4              79.6 - 97.8 *       MCH                         32.0              26.1 - 32.9 *       MCHC                        35.0              31.4 - 35.0 *       RDW                         11.9              11.9 - 14.6 %       PLATELET                    138 (L)           150 - 450 K/*       MPV                         10.3              9.4 - 12.3 FL       ABSOLUTE NRBC               0.00              0.0 - 0.2 K/*       DF                          AUTOMATED                             NEUTROPHILS                 70                43 - 78 %           LYMPHOCYTES                 18                13 - 44 %           MONOCYTES                   9                 4.0 - 12.0 %        EOSINOPHILS                 3                 0.5 - 7.8 %         BASOPHILS                   0                 0.0 - 2.0 %         IMMATURE GRANULOCYTES       0                 0.0 - 5.0 %         ABS. NEUTROPHILS            3.8               1.7 - 8.2 K/*       ABS. LYMPHOCYTES            1.0               0.5 - 4.6 K/*       ABS. MONOCYTES              0.5               0.1 - 1.3 K/*       ABS.  EOSINOPHILS            0.1 0.0 - 0.8 K/*       ABS. BASOPHILS              0.0               0.0 - 0.2 K/*       ABS. IMM. GRANS.            0.0               0.0 - 0.5 K/*  -METABOLIC PANEL, COMPREHENSIVE       Result                      Value             Ref Range           Sodium                      140               136 - 145 mm*       Potassium                   3.8               3.5 - 5.1 mm*       Chloride                    104               98 - 107 mmo*       CO2                         30                21 - 32 mmol*       Anion gap                   6 (L)             7 - 16 mmol/L       Glucose                     99                65 - 100 mg/*       BUN                         13                8 - 23 MG/DL        Creatinine                  1.17              0.8 - 1.5 MG*       GFR est AA                  >60               >60 ml/min/1*       GFR est non-AA              >60               >60 ml/min/1*       Calcium                     9.0               8.3 - 10.4 M*       Bilirubin, total            1.9 (H)           0.2 - 1.1 MG*       ALT (SGPT)                  31                12 - 65 U/L         AST (SGOT)                  21                15 - 37 U/L         Alk.  phosphatase            85                50 - 136 U/L        Protein, total              7.2               6.3 - 8.2 g/*       Albumin                     3.9               3.2 - 4.6 g/*       Globulin                    3.3               2.3 - 3.5 g/*       A-G Ratio                   1.2               1.2 - 3.5      -TROPONIN-HIGH SENSITIVITY       Result                      Value             Ref Range           Troponin-High Sensitiv*     7.5               0 - 14 pg/mL   -TROPONIN-HIGH SENSITIVITY       Result                      Value             Ref Range           Troponin-High Sensitiv*     7.5               0 - 14 pg/mL   -EKG, 12 LEAD, INITIAL       Result                      Value             Ref Range           Ventricular Rate            57 BPM                 Atrial Rate                 57                BPM                 P-R Interval                176               ms                  QRS Duration                92                ms                  Q-T Interval                426               ms                  QTC Calculation (Bezet)     414               ms                  Calculated P Axis           4                 degrees             Calculated R Axis           -15               degrees             Calculated T Axis           -25               degrees             Diagnosis                                                     !! AGE AND GENDER SPECIFIC ECG ANALYSIS !! Sinus bradycardia   Incomplete right bundle branch block   Borderline ECG   When compared with ECG of 07-MAR-2019 07:30,   Incomplete right bundle branch block is now Present     )  Tests in the radiology section of CPT®: ordered and reviewed (Xr Chest Port    Result Date: 10/15/2020  History: Chest pain, 7 days duration Exam: portable chest Comparison: 3/7/2019 Findings: No focal alveolar infiltrate or pleural effusion. No change in the appearance of the mediastinal contour or osseous structures.  Impressions: No acute findings.     )  Tests in the medicine section of CPT®: ordered and reviewed           Procedures

## 2022-03-18 PROBLEM — R07.89 CHEST TIGHTNESS: Status: ACTIVE | Noted: 2018-02-05

## 2022-03-19 PROBLEM — R07.9 CHEST PAIN: Status: ACTIVE | Noted: 2019-03-07

## 2022-11-11 NOTE — PROGRESS NOTES
[facility] CARDIOLOGY  [address] [facility], [address]  PHONE: [phone]      22    NAME:  Inge Salazar  : 1945  MRN: [id number]         SUBJECTIVE:   Inge Salazar is a 78 y.o. male seen for a follow up visit regarding the following:     Chief Complaint   Patient presents with    Annual Exam    Coronary Artery Disease              HPI:  Follow up  Annual Exam and Coronary Artery Disease   . Follow up CAD/CABG, dyslipidemia hypertension, GERD. Admits he's doing very little exercise but feeling well overall. Had a difficult year in some ways, was having neck pain and HA/dizziness, was found to have anemia, has had colonoscopy and endoscopy and was placed on PPI diagnosed with esophagitis but no active bleeding. His Hgb has since improved/recovered. His neck pain seems to have improved after a back injection and chiropractic adjustment. Thirdly, he was having orthostatic hypotension. His PCP adjusted his meds and that has resolved as well. He enjoys yard work and likes being active outside, just got out of the treadmill habit and having trouble getting back. Past cardiac history:   does not tolerate higher doses of statin. LIMA to LAD, SVG to ramus, SVG to Cx, Dr. Severo Montes 11. Preserved LV function   2016 - Cath - grafts patent   2018 - lexiscan MPI - normal perfusion and LVEF   Mar 2019 - Cath - patent graft. 2 small OM vessels.  Non cardiac chest pain          Key CAD CHF Meds            carvedilol (COREG) 12.5 MG tablet (Taking)    Class: Historical Med    olmesartan (BENICAR) 20 MG tablet (Taking)    Class: Historical Med    atorvastatin (LIPITOR) 20 MG tablet (Taking)    Class: Historical Med    triamterene-hydroCHLOROthiazide (DYAZIDE) 37.5-25 MG per capsule (Taking)    Class: Historical Med              Past Medical History, Past Surgical History, Family history, Social History, and Medications were all reviewed with the patient today and updated as necessary. Prior to Admission medications    Medication Sig Start Date End Date Taking? Authorizing Provider   carvedilol (COREG) 12.5 MG tablet Take 12.5 mg by mouth in the morning and at bedtime 11/1/22  Yes Historical Provider, MD   olmesartan (BENICAR) 20 MG tablet Take 20 mg by mouth daily 11/1/22  Yes Historical Provider, MD   fexofenadine (ALLEGRA) 180 MG tablet Take 180 mg by mouth daily as needed   Yes Historical Provider, MD   fluticasone (FLONASE) 50 MCG/ACT nasal spray 1 spray by Each Nostril route daily as needed for Rhinitis   Yes Historical Provider, MD   omeprazole (PRILOSEC) 40 MG delayed release capsule Take 40 mg by mouth daily   Yes Historical Provider, MD   nitroGLYCERIN (NITROSTAT) 0.4 MG SL tablet Place one SL under the tongue q 5 min prn cp, max 3 SL in a 15-min time period.  Call 911 if no relief after the 1st SL 11/14/22  Yes Alisia Linares MD   aspirin 81 MG chewable tablet Take 81 mg by mouth daily   Yes Ar Automatic Reconciliation   atorvastatin (LIPITOR) 20 MG tablet Take 20 mg by mouth daily   Yes Ar Automatic Reconciliation   triamterene-hydroCHLOROthiazide (DYAZIDE) 37.5-25 MG per capsule Take 1 capsule by mouth daily   Yes Ar Automatic Reconciliation     Allergies   Allergen Reactions    Ace Inhibitors Other (See Comments)     Angioedema     Past Medical History:   Diagnosis Date    CAD (coronary artery disease)     diagnosed in 46s    Cancer ([facility] [address]     skin - right hand    Dyslipidemia     ED (erectile dysfunction)     Gastrointestinal disorder     GERD (gastroesophageal reflux disease)     Hypertension     Skin cancer 2010    basal cell     Past Surgical History:   Procedure Laterality Date    APPENDECTOMY      CABG, VEIN, TWO  4/5/2011    CARDIAC CATHETERIZATION      CHOLECYSTECTOMY      VT CARDIAC SURG PROCEDURE UNLIST      cabg x 3 april 5, 2011     Family History   Problem Relation Age of Onset    Heart Disease Mother     Heart Disease Maternal Uncle     Cancer Father     Heart Disease Maternal Grandfather      Social History     Tobacco Use    Smoking status: Former     Types: Cigarettes     Quit date: 1984     Years since quittin.8    Smokeless tobacco: Never   Substance Use Topics    Alcohol use: Yes     Alcohol/week: 1.0 standard drink       ROS:    Review of Systems   Cardiovascular:  Negative for chest pain. Respiratory:  Negative for shortness of breath. PHYSICAL EXAM:   /80   Pulse 63   Ht 5' 10.5\" (1.791 m)   Wt 215 lb (97.5 kg)   BMI 30.41 kg/m²      Wt Readings from Last 3 Encounters:   22 215 lb (97.5 kg)   21 215 lb (97.5 kg)     BP Readings from Last 3 Encounters:   22 134/80   21 106/62     Pulse Readings from Last 3 Encounters:   22 63   21 60           Physical Exam  Constitutional:       Appearance: Normal appearance. HENT:      Head: Normocephalic and atraumatic. Eyes:      Conjunctiva/sclera: Conjunctivae normal.   Neck:      Vascular: No carotid bruit. Cardiovascular:      Rate and Rhythm: Normal rate and regular rhythm. Heart sounds: No murmur heard. No friction rub. No gallop. Pulmonary:      Effort: No respiratory distress. Breath sounds: No wheezing or rales. Musculoskeletal:         General: No swelling. Cervical back: Neck supple. Skin:     General: Skin is warm and dry. Neurological:      General: No focal deficit present. Mental Status: He is alert. Psychiatric:         Mood and Affect: Mood normal.         Behavior: Behavior normal.       Medical problems and test results were reviewed with the patient today.      DATA REVIEW      BMP  Lab Results   Component Value Date/Time     10/15/2020 11:42 AM    K 3.8 10/15/2020 11:42 AM     10/15/2020 11:42 AM    CO2 30 10/15/2020 11:42 AM    BUN 13 10/15/2020 11:42 AM    CREATININE 1.17 10/15/2020 11:42 AM    GLUCOSE 99 10/15/2020 11:42 AM    CALCIUM 9.0 10/15/2020 11:42 AM      Component 09/09/2022 08/23/2022 08/05/2022 08/02/2022 01/20/2022 10/19/2021 04/21/2021 12/09/2020 06/09/2020 12/09/2019                  White Blood Cells -- -- -- -- 5.9 4.9 4.9 5.3 5.6 5.7 Load older lab results   RBC 4.01 Low     3.68 Low     3.65 Low     3.71 Low     4.55 4.15 Low     4.22 Low     4.31 4.29 Low     4.52 Load older lab results   MCV 91.6 90.5 90.9 90.4 90.7 93.1 91.1 90.2 92.6 90.5 Load older lab results   Hemoglobin -- -- -- -- 14.0 13.4 Low     13.7 13.5 13.5 14.3 Load older lab results   Hematocrit -- -- -- -- 41.3 38.7 Low     38.5 Low     39.0 Low     39.8 Low     41.0 Load older lab results   MCH 31.5 31.6 32.4 32.7 30.9 32.3 32.4 31.3 31.6 31.6 Load older lab results   MCHC 34.4 34.9 35.6 36.1 High     34.0 34.7 35.5 34.6 34.0 34.9 Load older lab results   RDW 12.9 13.6 13.3 12.6 13.0 13.2 13.0 13.3 13.4 12.9 Load older lab results   Platelets 244               08/23/2022 08/02/2022 01/20/2022 10/19/2021 04/21/2021 12/09/2020 06/09/2020 12/09/2019 12/09/2019                  BUN -- -- 14 22 21 19 16 -- 15 Load older lab results   Sodium 144 141 140 140 141 137 139 -- 141 Load older lab results   Potassium 3.8 4.7 4.0 4.1 3.9 3.5 4.1 -- 3.9 Load older lab results   Chloride 109 105 100 102 103 101 103 -- 103 Load older lab results   CO2 -- -- 30.0 28.9 30.0 27.2 30.3 -- 30.3 Load older lab results   Glucose 97 99 105 High     99 98 104 High     83 -- 96 Load older lab results   Creatinine 1.05                    Cholesterol   Triglycerides   HDL   LDL Direct   VLDL   Non-HDL Chol.    Cholesterol,Total   HDL Cholesterol   VLDL Cholesterol Clifford   LDL Cholesterol Calc     Component 01/20/2022 12/09/2020 12/09/2019           Cholesterol 132 97 139 Load older lab results   Triglycerides 86 89 91 Load older lab results   HDL 58 44 58 Load older lab results   LDL Direct 62.0 45.0 69.0 Load older lab results   VLDL 17 18 18 Load older lab results   Non-HDL Chol. 74 EKG    Sinus  Rhythm 63  normal axis, intervals, ST and T waves      CXR/IMAGING        DEVICE INTERROGATION        OUTSIDE RECORDS REVIEW    Records from outside providers have been reviewed and summarized as noted in the HPI, past history and data review sections of this note, and reviewed with patient. .       ASSESSMENT and PLAN    Sumanth Nath was seen today for annual exam and coronary artery disease. Diagnoses and all orders for this visit:    Coronary artery disease of native artery of native heart with stable angina pectoris (HCC)  -     EKG 12 Lead    Primary hypertension    Dyslipidemia    S/P CABG x 2    Other orders  -     nitroGLYCERIN (NITROSTAT) 0.4 MG SL tablet; Place one SL under the tongue q 5 min prn cp, max 3 SL in a 15-min time period. Call 911 if no relief after the 1st SL        IMPRESSION:    Lipids at goal, continue meds    Patient is encouraged to engage in moderate physical activity for at least 30 minutes on at least 6 days of the week, and to follow a diet rich in whole grains, fruits and vegetables, proven to reduce the incidence of events related to cardiovascular disease. For more detailed information, patient is referred to www.cardiosmart.org.      BP at target, continue meds      No angina, continue meds and lifestyle modification            Return in about 1 year (around 11/14/2023). Thank you for allowing me to participate in this patient's care. Please call or contact me if there are any questions or concerns regarding the above.       Kathleen Vega MD  11/14/22  11:38 AM

## 2022-11-14 ENCOUNTER — OFFICE VISIT (OUTPATIENT)
Dept: CARDIOLOGY CLINIC | Age: 77
End: 2022-11-14
Payer: MEDICARE

## 2022-11-14 VITALS
WEIGHT: 215 LBS | HEART RATE: 63 BPM | DIASTOLIC BLOOD PRESSURE: 80 MMHG | BODY MASS INDEX: 30.1 KG/M2 | HEIGHT: 71 IN | SYSTOLIC BLOOD PRESSURE: 134 MMHG

## 2022-11-14 DIAGNOSIS — I25.118 CORONARY ARTERY DISEASE OF NATIVE ARTERY OF NATIVE HEART WITH STABLE ANGINA PECTORIS (HCC): Primary | ICD-10-CM

## 2022-11-14 DIAGNOSIS — E78.5 DYSLIPIDEMIA: ICD-10-CM

## 2022-11-14 DIAGNOSIS — I10 PRIMARY HYPERTENSION: ICD-10-CM

## 2022-11-14 DIAGNOSIS — Z95.1 S/P CABG X 2: ICD-10-CM

## 2022-11-14 PROCEDURE — 3078F DIAST BP <80 MM HG: CPT | Performed by: INTERNAL MEDICINE

## 2022-11-14 PROCEDURE — 99213 OFFICE O/P EST LOW 20 MIN: CPT | Performed by: INTERNAL MEDICINE

## 2022-11-14 PROCEDURE — 93000 ELECTROCARDIOGRAM COMPLETE: CPT | Performed by: INTERNAL MEDICINE

## 2022-11-14 PROCEDURE — 3074F SYST BP LT 130 MM HG: CPT | Performed by: INTERNAL MEDICINE

## 2022-11-14 PROCEDURE — 1123F ACP DISCUSS/DSCN MKR DOCD: CPT | Performed by: INTERNAL MEDICINE

## 2022-11-14 RX ORDER — OLMESARTAN MEDOXOMIL 20 MG/1
20 TABLET ORAL DAILY
COMMUNITY
Start: 2022-11-01

## 2022-11-14 RX ORDER — FLUTICASONE PROPIONATE 50 MCG
1 SPRAY, SUSPENSION (ML) NASAL DAILY PRN
COMMUNITY

## 2022-11-14 RX ORDER — OMEPRAZOLE 40 MG/1
40 CAPSULE, DELAYED RELEASE ORAL DAILY
COMMUNITY

## 2022-11-14 RX ORDER — CARVEDILOL 12.5 MG/1
12.5 TABLET ORAL 2 TIMES DAILY
COMMUNITY
Start: 2022-11-01

## 2022-11-14 RX ORDER — FEXOFENADINE HCL 180 MG/1
180 TABLET ORAL DAILY PRN
COMMUNITY

## 2022-11-14 RX ORDER — NITROGLYCERIN 0.4 MG/1
TABLET SUBLINGUAL
Qty: 25 TABLET | Refills: 5 | Status: SHIPPED | OUTPATIENT
Start: 2022-11-14

## 2022-11-14 ASSESSMENT — ENCOUNTER SYMPTOMS: SHORTNESS OF BREATH: 0

## 2022-11-14 NOTE — PATIENT INSTRUCTIONS
Patient Education        Learning About the Mediterranean Diet  What is the [address] [facility]? The Mediterranean diet is a style of eating rather than a diet plan. It features foods eaten in [place], [place], [place] and [place], and other countries along the [facility]. It emphasizes eating foods like fish, fruits, vegetables, beans, high-fiber breads and whole grains, nuts, and olive oil. This style of eating includes limited red meat, cheese, and sweets. Why choose the Mediterranean diet? A Mediterranean-style diet may improve heart health. It contains more fat than other heart-healthy diets. But the fats are mainly from nuts, unsaturated oils (such as fish oils and olive oil), and certain nut or seed oils (such as canola, soybean, or flaxseed oil). These fats may help protect the heart and blood vessels. How can you get started on the Mediterranean diet? Here are some things you can do to switch to a more Mediterranean way of eating. What to eat  Eat a variety of fruits and vegetables each day, such as grapes, blueberries, tomatoes, broccoli, peppers, figs, olives, spinach, eggplant, beans, lentils, and chickpeas. Eat a variety of whole-grain foods each day, such as oats, brown rice, and whole wheat bread, pasta, and couscous. Eat fish at least 2 times a week. Try tuna, salmon, mackerel, lake trout, herring, or sardines. Eat moderate amounts of low-fat dairy products, such as milk, cheese, or yogurt. Eat moderate amounts of poultry and eggs. Choose healthy (unsaturated) fats, such as nuts, olive oil, and certain nut or seed oils like canola, soybean, and flaxseed. Limit unhealthy (saturated) fats, such as butter, palm oil, and coconut oil. And limit fats found in animal products, such as meat and dairy products made with whole milk. Try to eat red meat only a few times a month in very small amounts. Limit sweets and desserts to only a few times a week.  This includes sugar-sweetened drinks like soda. The Mediterranean diet may also include red wine with your meal--1 glass each day for women and up to 2 glasses a day for men. Tips for eating at home  Use herbs, spices, garlic, lemon zest, and citrus juice instead of salt to add flavor to foods. Add avocado slices to your sandwich instead of jeronimo. Have fish for lunch or dinner instead of red meat. Brush the fish with olive oil, and broil or grill it. Sprinkle your salad with seeds or nuts instead of cheese. Cook with olive or canola oil instead of butter or oils that are high in saturated fat. Switch from 2% milk or whole milk to 1% or fat-free milk. Dip raw vegetables in a vinaigrette dressing or hummus instead of dips made from mayonnaise or sour cream.  Have a piece of fruit for dessert instead of a piece of cake. Try baked apples, or have some dried fruit. Tips for eating out  Try broiled, grilled, baked, or poached fish instead of having it fried or breaded. Ask your  to have your meals prepared with olive oil instead of butter. Order dishes made with marinara sauce or sauces made from olive oil. Avoid sauces made from cream or mayonnaise. Choose whole-grain breads, whole wheat pasta and pizza crust, brown rice, beans, and lentils. Cut back on butter or margarine on bread. Instead, you can dip your bread in a small amount of olive oil. Ask for a side salad or grilled vegetables instead of french fries or chips. Where can you learn more? Go to https://[employer].eWings.com. org and sign in to your [employer] account. Enter [phone] in the [facility] box to learn more about \"Learning About the Mediterranean Diet. \"     If you do not have an account, please click on the \"Sign Up Now\" link. Current as of: May 9, 2022               Content Version: 13.4  © 7531-4177 Healthwise, Incorporated. Care instructions adapted under license by [facility] ([facility]).  If you have questions about a medical condition or this instruction, always ask your healthcare professional. [address] any warranty or liability for your use of this information. Please visit www.cardiosmart. org for more information regarding cardiovascular disease prevention and treatment.

## 2023-05-13 ENCOUNTER — HOSPITAL ENCOUNTER (EMERGENCY)
Age: 78
Discharge: HOME | End: 2023-05-13
Attending: EMERGENCY MEDICINE
Payer: COMMERCIAL

## 2023-05-13 ENCOUNTER — APPOINTMENT (OUTPATIENT)
Dept: GENERAL RADIOLOGY | Age: 78
End: 2023-05-13
Payer: COMMERCIAL

## 2023-05-13 VITALS
RESPIRATION RATE: 17 BRPM | BODY MASS INDEX: 30.1 KG/M2 | SYSTOLIC BLOOD PRESSURE: 117 MMHG | HEIGHT: 71 IN | HEART RATE: 51 BPM | OXYGEN SATURATION: 96 % | WEIGHT: 215 LBS | TEMPERATURE: 98 F | DIASTOLIC BLOOD PRESSURE: 66 MMHG

## 2023-05-13 DIAGNOSIS — R07.9 CHEST PAIN, UNSPECIFIED TYPE: Primary | ICD-10-CM

## 2023-05-13 DIAGNOSIS — R00.1 BRADYCARDIA: ICD-10-CM

## 2023-05-13 LAB
ALBUMIN SERPL-MCNC: 4 G/DL (ref 3.2–4.6)
ALBUMIN/GLOB SERPL: 1.3 (ref 0.4–1.6)
ALP SERPL-CCNC: 84 U/L (ref 50–136)
ALT SERPL-CCNC: 36 U/L (ref 12–65)
ANION GAP SERPL CALC-SCNC: 3 MMOL/L (ref 2–11)
AST SERPL-CCNC: 15 U/L (ref 15–37)
BILIRUB SERPL-MCNC: 1.1 MG/DL (ref 0.2–1.1)
BUN SERPL-MCNC: 25 MG/DL (ref 8–23)
CALCIUM SERPL-MCNC: 9 MG/DL (ref 8.3–10.4)
CHLORIDE SERPL-SCNC: 108 MMOL/L (ref 101–110)
CO2 SERPL-SCNC: 27 MMOL/L (ref 21–32)
CREAT SERPL-MCNC: 1.4 MG/DL (ref 0.8–1.5)
EKG ATRIAL RATE: 48 BPM
EKG DIAGNOSIS: NORMAL
EKG P AXIS: 19 DEGREES
EKG P-R INTERVAL: 154 MS
EKG Q-T INTERVAL: 612 MS
EKG QRS DURATION: 102 MS
EKG QTC CALCULATION (BAZETT): 546 MS
EKG R AXIS: -5 DEGREES
EKG T AXIS: 76 DEGREES
EKG VENTRICULAR RATE: 48 BPM
ERYTHROCYTE [DISTWIDTH] IN BLOOD BY AUTOMATED COUNT: 12.6 % (ref 11.9–14.6)
GLOBULIN SER CALC-MCNC: 3.2 G/DL (ref 2.8–4.5)
GLUCOSE SERPL-MCNC: 158 MG/DL (ref 65–100)
HCT VFR BLD AUTO: 33.7 % (ref 41.1–50.3)
HGB BLD-MCNC: 11.3 G/DL (ref 13.6–17.2)
MCH RBC QN AUTO: 32 PG (ref 26.1–32.9)
MCHC RBC AUTO-ENTMCNC: 33.5 G/DL (ref 31.4–35)
MCV RBC AUTO: 95.5 FL (ref 82–102)
NRBC # BLD: 0 K/UL (ref 0–0.2)
PLATELET # BLD AUTO: 134 K/UL (ref 150–450)
PMV BLD AUTO: 10.9 FL (ref 9.4–12.3)
POTASSIUM SERPL-SCNC: 3.7 MMOL/L (ref 3.5–5.1)
PROT SERPL-MCNC: 7.2 G/DL (ref 6.3–8.2)
RBC # BLD AUTO: 3.53 M/UL (ref 4.23–5.6)
SODIUM SERPL-SCNC: 138 MMOL/L (ref 133–143)
TROPONIN I BLD-MCNC: 0 NG/ML (ref 0.02–0.05)
WBC # BLD AUTO: 6.3 K/UL (ref 4.3–11.1)

## 2023-05-13 PROCEDURE — 94761 N-INVAS EAR/PLS OXIMETRY MLT: CPT

## 2023-05-13 PROCEDURE — 84484 ASSAY OF TROPONIN QUANT: CPT

## 2023-05-13 PROCEDURE — 80053 COMPREHEN METABOLIC PANEL: CPT

## 2023-05-13 PROCEDURE — 93010 ELECTROCARDIOGRAM REPORT: CPT | Performed by: INTERNAL MEDICINE

## 2023-05-13 PROCEDURE — 93005 ELECTROCARDIOGRAM TRACING: CPT

## 2023-05-13 PROCEDURE — 71045 X-RAY EXAM CHEST 1 VIEW: CPT

## 2023-05-13 PROCEDURE — 99285 EMERGENCY DEPT VISIT HI MDM: CPT

## 2023-05-13 PROCEDURE — 85027 COMPLETE CBC AUTOMATED: CPT

## 2023-05-13 ASSESSMENT — LIFESTYLE VARIABLES
HOW MANY STANDARD DRINKS CONTAINING ALCOHOL DO YOU HAVE ON A TYPICAL DAY: PATIENT DOES NOT DRINK
HOW OFTEN DO YOU HAVE A DRINK CONTAINING ALCOHOL: NEVER

## 2023-05-13 ASSESSMENT — PAIN DESCRIPTION - LOCATION: LOCATION: CHEST;ARM

## 2023-05-13 ASSESSMENT — PAIN DESCRIPTION - DESCRIPTORS: DESCRIPTORS: TIGHTNESS

## 2023-05-13 ASSESSMENT — PAIN SCALES - GENERAL: PAINLEVEL_OUTOF10: 4

## 2023-05-13 ASSESSMENT — PAIN DESCRIPTION - ORIENTATION: ORIENTATION: LEFT

## 2023-05-13 NOTE — ED TRIAGE NOTES
Pt arrives with c/o chest tightness that radiates into left arm over the last two weeks. Denies any alleviating or aggravating factors. States today he has had worsening headache, increased tightness in chest, and worsening lightheadedness. Has hx of triple bypass in 2010 and has similar symptoms. States he has been evaluated for headache and lightheadedness by several providers with unknown cause, that began over two months ago. Denies shortness of breath, diaphoresis, or n/v. States he took one NTG en route without relief. States he has been compliant with all home medications. Does not take blood thinner.

## 2023-05-13 NOTE — ED PROVIDER NOTES
Emergency Department Provider Note                   PCP:                Carly Rausch MD               Age: 70 y.o. Sex: male     DISPOSITION Decision To Discharge 05/13/2023 09:01:26 PM       ICD-10-CM    1. Chest pain, unspecified type  R07.9       2. Bradycardia  R00.1           MEDICAL DECISION MAKING  Complexity of Problems Addressed:  Complexity of Problem: 1 acute, uncomplicated illness or injury. Data Reviewed and Analyzed:  Category 1:   I reviewed external records: ED visit note from an outside group. I reviewed external records: provider visit note from PCP. I reviewed external records: provider visit note from outside specialist.  I reviewed external records: previous EKG including cardiologist interpretation. I reviewed external records: previous lab results from outside ED. I ordered each unique test.  I reviewed the results of each unique test.      Category 2:   I interpreted the X-rays. And agree there is no acute cardiopulmonary abnormalities. Cath 3/7/19: FINDINGS  Left Ventricle: 65  LVEDP: 15  Left Main:ok  Left Anterior descending coronary artery: occluded  Left Circumflex coronary artery: patent 2 small om vessels  Right coronary artery: patent  Graft anatomy: lima to lad patent  Sv to ramus patent  Sv to om3 patent     Intervention if done: na     Conclusions: stable cad    Category 3: Discussion of management or test interpretation. 70-year-old male presents complaining of chest pain for the last 3 weeks. He states it has been constant for the past 3 days and radiating into his left arm. Patient did have a CABG procedure in 2010 but has been stable since. He had a cath in March 2019 that did not require any further stenting. He is a patient of  with [facility] cardiology. Today, patient is bradycardic with an initial rate of 45. His EKG revealed sinus bradycardia with an incomplete right bundle branch block that was on his previous EKG in October 2020.

## 2023-05-17 NOTE — PROGRESS NOTES
[facility] CARDIOLOGY  [address] [facility] [address], [address]  PHONE: [phone]      23    NAME:  Rios Shelton  : 1945  MRN: [id number]         SUBJECTIVE:   Rios Shelton is a 70 y.o. male seen for a follow up visit regarding the following:     Chief Complaint   Patient presents with    Dizziness    Coronary Artery Disease            HPI:  Follow up  Dizziness and Coronary Artery Disease   . Follow up CAD/CABG, dyslipidemia hypertension, GERD. Returns following ER visit  with prolonged chest and arm pain, dyspnea, with negative evaluation. Reviewed the EKG - QT appears prolonged, repeat today, also needs lipid panel  Says his symptoms began with URI and ear congestion nearly 6 months ago, treated with antibiotics, has remained light headed to this day. Had brain imaging, carotid artery imaging all reportedly negative and was asked to see cardiologist.  The last 3 weeks started with chest tightness that has lasted all day long though most of the time comes and goes, says he sometimes wonders if it is his anxiety and also notes he's been thinking it could be his diet of spicy foods. His wife notes NTG en route to ER had no effect. His ears continue to pop and feel full. He's not taking antihistamine, took flonase for a bit but read it could make you feel light headed so he stopped it. Eating well. Very little exercise, mostly because he is \"lazy\" (his own word), but did work in his yard 2 days ago and felt well. BP at home has been 120/60's, averages 120-130/60s, has seen it as low as 117, he's not seen it below 100 except for this morning. Past cardiac history:   does not tolerate higher doses of statin. LIMA to LAD, SVG to ramus, SVG to Cx, Dr. Jennie Wolfe 11. Preserved LV function   2016 - Cath - grafts patent   2018 - lexiscan MPI - normal perfusion and LVEF   Mar 2019 - Cath - patent graft. 2 small OM vessels.  Non cardiac chest

## 2023-05-18 ENCOUNTER — OFFICE VISIT (OUTPATIENT)
Age: 78
End: 2023-05-18
Payer: COMMERCIAL

## 2023-05-18 VITALS
WEIGHT: 223 LBS | BODY MASS INDEX: 31.92 KG/M2 | SYSTOLIC BLOOD PRESSURE: 98 MMHG | HEART RATE: 44 BPM | HEIGHT: 70 IN | DIASTOLIC BLOOD PRESSURE: 50 MMHG

## 2023-05-18 DIAGNOSIS — I10 ESSENTIAL HYPERTENSION: ICD-10-CM

## 2023-05-18 DIAGNOSIS — Z95.1 S/P CABG X 2: ICD-10-CM

## 2023-05-18 DIAGNOSIS — R00.1 BRADYCARDIA: ICD-10-CM

## 2023-05-18 DIAGNOSIS — E78.5 DYSLIPIDEMIA: ICD-10-CM

## 2023-05-18 DIAGNOSIS — I25.118 CORONARY ARTERY DISEASE OF NATIVE ARTERY OF NATIVE HEART WITH STABLE ANGINA PECTORIS (HCC): ICD-10-CM

## 2023-05-18 DIAGNOSIS — R42 VERTIGO: ICD-10-CM

## 2023-05-18 DIAGNOSIS — R07.89 CHEST DISCOMFORT: Primary | ICD-10-CM

## 2023-05-18 LAB
CHOLEST SERPL-MCNC: 114 MG/DL
HDLC SERPL-MCNC: 41 MG/DL (ref 40–60)
HDLC SERPL: 2.8
LDLC SERPL CALC-MCNC: 51.2 MG/DL
TRIGL SERPL-MCNC: 109 MG/DL (ref 35–150)
VLDLC SERPL CALC-MCNC: 21.8 MG/DL (ref 6–23)

## 2023-05-18 PROCEDURE — 3074F SYST BP LT 130 MM HG: CPT | Performed by: INTERNAL MEDICINE

## 2023-05-18 PROCEDURE — 99214 OFFICE O/P EST MOD 30 MIN: CPT | Performed by: INTERNAL MEDICINE

## 2023-05-18 PROCEDURE — 1123F ACP DISCUSS/DSCN MKR DOCD: CPT | Performed by: INTERNAL MEDICINE

## 2023-05-18 PROCEDURE — 93000 ELECTROCARDIOGRAM COMPLETE: CPT | Performed by: INTERNAL MEDICINE

## 2023-05-18 PROCEDURE — 3078F DIAST BP <80 MM HG: CPT | Performed by: INTERNAL MEDICINE

## 2023-05-18 RX ORDER — CARVEDILOL 6.25 MG/1
6.25 TABLET ORAL 2 TIMES DAILY WITH MEALS
Qty: 180 TABLET | Refills: 3 | COMMUNITY
Start: 2023-05-18

## 2023-06-30 ENCOUNTER — OFFICE VISIT (OUTPATIENT)
Age: 78
End: 2023-06-30
Payer: COMMERCIAL

## 2023-06-30 VITALS
DIASTOLIC BLOOD PRESSURE: 60 MMHG | HEIGHT: 70 IN | BODY MASS INDEX: 31.35 KG/M2 | WEIGHT: 219 LBS | HEART RATE: 56 BPM | SYSTOLIC BLOOD PRESSURE: 120 MMHG

## 2023-06-30 DIAGNOSIS — I25.118 CORONARY ARTERY DISEASE OF NATIVE ARTERY OF NATIVE HEART WITH STABLE ANGINA PECTORIS (HCC): ICD-10-CM

## 2023-06-30 DIAGNOSIS — R07.89 CHEST DISCOMFORT: ICD-10-CM

## 2023-06-30 DIAGNOSIS — R00.1 BRADYCARDIA: Primary | ICD-10-CM

## 2023-06-30 PROCEDURE — 3078F DIAST BP <80 MM HG: CPT | Performed by: INTERNAL MEDICINE

## 2023-06-30 PROCEDURE — 99214 OFFICE O/P EST MOD 30 MIN: CPT | Performed by: INTERNAL MEDICINE

## 2023-06-30 PROCEDURE — 3074F SYST BP LT 130 MM HG: CPT | Performed by: INTERNAL MEDICINE

## 2023-06-30 PROCEDURE — 1123F ACP DISCUSS/DSCN MKR DOCD: CPT | Performed by: INTERNAL MEDICINE

## 2023-06-30 ASSESSMENT — ENCOUNTER SYMPTOMS: SHORTNESS OF BREATH: 1

## 2023-08-07 NOTE — PROGRESS NOTES
[facility] CARDIOLOGY  [address] [facility], [address]  PHONE: [phone]      23    NAME:  Edilberto Mccain  : 1945  MRN: [id number]         SUBJECTIVE:   Edilberto Mccain is a 70 y.o. male seen for a follow up visit regarding the following:     Chief Complaint   Patient presents with    Results     Nuke results    Hypertension    Coronary Artery Disease            HPI:  Follow up  Results (Nuke results), Hypertension, and Coronary Artery Disease   . Follow up CAD/CABG, dyslipidemia hypertension, GERD. BB stopped last visit d/t symptomatic bradycardia. Nst was normal, but he walked into stage 3 unable to achieve more than 74% of predicted, he now has a class 1 indication for pacemaker, which will also allow us to potentially resume BB therapy with chronic ischemic heart disease. He has felt ok overall. Able to mow the grass and even feels a little energized by that but still having episodes of dizziness, lethargy with other tasks around home. Past cardiac history:   does not tolerate higher doses of statin. LIMA to LAD, SVG to ramus, SVG to Cx, Dr. Chaz Lee 11. Preserved LV function   2016 - Cath - grafts patent   2018 - lexiscan MPI - normal perfusion and LVEF   Mar 2019 - Cath - patent graft. 2 small OM vessels. Non cardiac chest pain  2023       SR, SB. Avg 53, range 35 (sleep) - 99. Several diary entries but no correlation with rates, some in the 60's others in the 40's or 50's. No pauses or high degree AV block. - OFF BB for this monitor       NST - normal perfusion and EF-chronotropic incompetence - achieved only 74% MPHR in stage 3.            Key CAD CHF Meds            olmesartan (BENICAR) 20 MG tablet (Taking)    Class: Historical Med    atorvastatin (LIPITOR) 20 MG tablet (Taking)    Class: Historical Med    triamterene-hydroCHLOROthiazide (DYAZIDE) 37.5-25 MG per capsule (Taking)    Class: Historical Med          Key

## 2023-08-08 ENCOUNTER — OFFICE VISIT (OUTPATIENT)
Age: 78
End: 2023-08-08
Payer: COMMERCIAL

## 2023-08-08 VITALS
WEIGHT: 212 LBS | SYSTOLIC BLOOD PRESSURE: 132 MMHG | HEART RATE: 70 BPM | BODY MASS INDEX: 30.35 KG/M2 | DIASTOLIC BLOOD PRESSURE: 70 MMHG | HEIGHT: 70 IN

## 2023-08-08 DIAGNOSIS — I49.5 CHRONOTROPIC INCOMPETENCE WITH SINUS NODE DYSFUNCTION (HCC): Primary | ICD-10-CM

## 2023-08-08 DIAGNOSIS — I25.118 CORONARY ARTERY DISEASE OF NATIVE ARTERY OF NATIVE HEART WITH STABLE ANGINA PECTORIS (HCC): ICD-10-CM

## 2023-08-08 PROCEDURE — 3078F DIAST BP <80 MM HG: CPT | Performed by: INTERNAL MEDICINE

## 2023-08-08 PROCEDURE — 3075F SYST BP GE 130 - 139MM HG: CPT | Performed by: INTERNAL MEDICINE

## 2023-08-08 PROCEDURE — 99214 OFFICE O/P EST MOD 30 MIN: CPT | Performed by: INTERNAL MEDICINE

## 2023-08-08 PROCEDURE — 1123F ACP DISCUSS/DSCN MKR DOCD: CPT | Performed by: INTERNAL MEDICINE

## 2023-08-09 NOTE — PROGRESS NOTES
Patient pre-assessment complete for PPM scheduled for 8/10/23, arrival time 0730. Patient verified using . Patient instructed to bring a list of all home medications on the day of procedure. NPO status reinforced. Patient instructed to HOLD all other AM meds. Instructed they can take all other medications excluding vitamins & supplements. Patient verbalizes understanding of all instructions & denies any questions at this time.

## 2023-08-10 ENCOUNTER — APPOINTMENT (OUTPATIENT)
Dept: GENERAL RADIOLOGY | Age: 78
End: 2023-08-10
Attending: INTERNAL MEDICINE
Payer: COMMERCIAL

## 2023-08-10 ENCOUNTER — HOSPITAL ENCOUNTER (OUTPATIENT)
Age: 78
Discharge: HOME | End: 2023-08-11
Attending: INTERNAL MEDICINE | Admitting: INTERNAL MEDICINE
Payer: COMMERCIAL

## 2023-08-10 DIAGNOSIS — I49.5 CHRONOTROPIC INCOMPETENCE WITH SINUS NODE DYSFUNCTION (HCC): ICD-10-CM

## 2023-08-10 PROBLEM — I49.8 CHRONOTROPIC INCOMPETENCE WITH SINUS NODE DYSFUNCTION: Status: ACTIVE | Noted: 2023-08-10

## 2023-08-10 PROBLEM — Z95.0 S/P PLACEMENT OF CARDIAC PACEMAKER: Status: ACTIVE | Noted: 2023-08-10

## 2023-08-10 LAB
ANION GAP SERPL CALC-SCNC: 8 MMOL/L (ref 2–11)
BUN SERPL-MCNC: 24 MG/DL (ref 8–23)
CALCIUM SERPL-MCNC: 9.5 MG/DL (ref 8.3–10.4)
CHLORIDE SERPL-SCNC: 107 MMOL/L (ref 101–110)
CO2 SERPL-SCNC: 26 MMOL/L (ref 21–32)
CREAT SERPL-MCNC: 1.7 MG/DL (ref 0.8–1.5)
ECHO BSA: 2.18 M2
EKG ATRIAL RATE: 70 BPM
EKG DIAGNOSIS: NORMAL
EKG P AXIS: 13 DEGREES
EKG P-R INTERVAL: 154 MS
EKG Q-T INTERVAL: 396 MS
EKG QRS DURATION: 98 MS
EKG QTC CALCULATION (BAZETT): 427 MS
EKG R AXIS: -25 DEGREES
EKG T AXIS: 46 DEGREES
EKG VENTRICULAR RATE: 70 BPM
ERYTHROCYTE [DISTWIDTH] IN BLOOD BY AUTOMATED COUNT: 12 % (ref 11.9–14.6)
GLUCOSE SERPL-MCNC: 122 MG/DL (ref 65–100)
HCT VFR BLD AUTO: 37.8 % (ref 41.1–50.3)
HGB BLD-MCNC: 12.6 G/DL (ref 13.6–17.2)
MAGNESIUM SERPL-MCNC: 2.5 MG/DL (ref 1.8–2.4)
MCH RBC QN AUTO: 31.4 PG (ref 26.1–32.9)
MCHC RBC AUTO-ENTMCNC: 33.3 G/DL (ref 31.4–35)
MCV RBC AUTO: 94.3 FL (ref 82–102)
NRBC # BLD: 0 K/UL (ref 0–0.2)
PLATELET # BLD AUTO: 136 K/UL (ref 150–450)
PMV BLD AUTO: 10.4 FL (ref 9.4–12.3)
POTASSIUM SERPL-SCNC: 3.9 MMOL/L (ref 3.5–5.1)
RBC # BLD AUTO: 4.01 M/UL (ref 4.23–5.6)
SODIUM SERPL-SCNC: 141 MMOL/L (ref 133–143)
WBC # BLD AUTO: 5.8 K/UL (ref 4.3–11.1)

## 2023-08-10 PROCEDURE — 85027 COMPLETE CBC AUTOMATED: CPT

## 2023-08-10 PROCEDURE — 99153 MOD SED SAME PHYS/QHP EA: CPT | Performed by: INTERNAL MEDICINE

## 2023-08-10 PROCEDURE — 93010 ELECTROCARDIOGRAM REPORT: CPT | Performed by: INTERNAL MEDICINE

## 2023-08-10 PROCEDURE — 6370000000 HC RX 637 (ALT 250 FOR IP): Performed by: NURSE PRACTITIONER

## 2023-08-10 PROCEDURE — 93005 ELECTROCARDIOGRAM TRACING: CPT | Performed by: INTERNAL MEDICINE

## 2023-08-10 PROCEDURE — 33208 INSRT HEART PM ATRIAL & VENT: CPT | Performed by: INTERNAL MEDICINE

## 2023-08-10 PROCEDURE — C1898 LEAD, PMKR, OTHER THAN TRANS: HCPCS | Performed by: INTERNAL MEDICINE

## 2023-08-10 PROCEDURE — 2709999900 HC NON-CHARGEABLE SUPPLY: Performed by: INTERNAL MEDICINE

## 2023-08-10 PROCEDURE — 2500000003 HC RX 250 WO HCPCS: Performed by: INTERNAL MEDICINE

## 2023-08-10 PROCEDURE — 2720000010 HC SURG SUPPLY STERILE: Performed by: INTERNAL MEDICINE

## 2023-08-10 PROCEDURE — L3660 SO 8 AB RSTR CAN/WEB PRE OTS: HCPCS | Performed by: INTERNAL MEDICINE

## 2023-08-10 PROCEDURE — 6360000002 HC RX W HCPCS: Performed by: INTERNAL MEDICINE

## 2023-08-10 PROCEDURE — 80048 BASIC METABOLIC PNL TOTAL CA: CPT

## 2023-08-10 PROCEDURE — 2580000003 HC RX 258: Performed by: INTERNAL MEDICINE

## 2023-08-10 PROCEDURE — 99152 MOD SED SAME PHYS/QHP 5/>YRS: CPT | Performed by: INTERNAL MEDICINE

## 2023-08-10 PROCEDURE — 71045 X-RAY EXAM CHEST 1 VIEW: CPT

## 2023-08-10 PROCEDURE — 83735 ASSAY OF MAGNESIUM: CPT

## 2023-08-10 PROCEDURE — C1892 INTRO/SHEATH,FIXED,PEEL-AWAY: HCPCS | Performed by: INTERNAL MEDICINE

## 2023-08-10 PROCEDURE — C1785 PMKR, DUAL, RATE-RESP: HCPCS | Performed by: INTERNAL MEDICINE

## 2023-08-10 PROCEDURE — 6370000000 HC RX 637 (ALT 250 FOR IP): Performed by: INTERNAL MEDICINE

## 2023-08-10 DEVICE — LEAD 5076-58 MRI US RCMCRD
Type: IMPLANTABLE DEVICE | Site: HEART | Status: FUNCTIONAL
Brand: CAPSUREFIX NOVUS MRI™ SURESCAN®

## 2023-08-10 DEVICE — IPG W1DR01 AZURE XT DR MRI USA
Type: IMPLANTABLE DEVICE | Site: HEART | Status: FUNCTIONAL
Brand: AZURE™ XT DR MRI SURESCAN™

## 2023-08-10 DEVICE — LEAD 5076-52 MRI US RCMCRD
Type: IMPLANTABLE DEVICE | Site: HEART | Status: FUNCTIONAL
Brand: CAPSUREFIX NOVUS MRI™ SURESCAN®

## 2023-08-10 RX ORDER — MIDAZOLAM HYDROCHLORIDE 1 MG/ML
INJECTION INTRAMUSCULAR; INTRAVENOUS PRN
Status: DISCONTINUED | OUTPATIENT
Start: 2023-08-10 | End: 2023-08-10 | Stop reason: HOSPADM

## 2023-08-10 RX ORDER — LIDOCAINE HYDROCHLORIDE 10 MG/ML
INJECTION, SOLUTION INFILTRATION; PERINEURAL PRN
Status: DISCONTINUED | OUTPATIENT
Start: 2023-08-10 | End: 2023-08-10 | Stop reason: HOSPADM

## 2023-08-10 RX ORDER — SODIUM CHLORIDE 9 MG/ML
INJECTION, SOLUTION INTRAVENOUS CONTINUOUS
Status: DISCONTINUED | OUTPATIENT
Start: 2023-08-10 | End: 2023-08-11 | Stop reason: HOSPADM

## 2023-08-10 RX ORDER — ACETAMINOPHEN 325 MG/1
650 TABLET ORAL EVERY 6 HOURS PRN
Status: DISCONTINUED | OUTPATIENT
Start: 2023-08-10 | End: 2023-08-11 | Stop reason: HOSPADM

## 2023-08-10 RX ORDER — SODIUM CHLORIDE 0.9 % (FLUSH) 0.9 %
5-40 SYRINGE (ML) INJECTION PRN
Status: DISCONTINUED | OUTPATIENT
Start: 2023-08-10 | End: 2023-08-11 | Stop reason: HOSPADM

## 2023-08-10 RX ORDER — LOSARTAN POTASSIUM 50 MG/1
50 TABLET ORAL DAILY
Status: DISCONTINUED | OUTPATIENT
Start: 2023-08-11 | End: 2023-08-11 | Stop reason: HOSPADM

## 2023-08-10 RX ORDER — SODIUM CHLORIDE 9 MG/ML
INJECTION, SOLUTION INTRAVENOUS PRN
Status: DISCONTINUED | OUTPATIENT
Start: 2023-08-10 | End: 2023-08-11 | Stop reason: HOSPADM

## 2023-08-10 RX ORDER — NITROGLYCERIN 0.4 MG/1
0.4 TABLET SUBLINGUAL EVERY 5 MIN PRN
Status: DISCONTINUED | OUTPATIENT
Start: 2023-08-10 | End: 2023-08-11 | Stop reason: HOSPADM

## 2023-08-10 RX ORDER — ASPIRIN 81 MG/1
81 TABLET, CHEWABLE ORAL DAILY
Status: DISCONTINUED | OUTPATIENT
Start: 2023-08-11 | End: 2023-08-11 | Stop reason: HOSPADM

## 2023-08-10 RX ORDER — SODIUM CHLORIDE 0.9 % (FLUSH) 0.9 %
5-40 SYRINGE (ML) INJECTION EVERY 12 HOURS SCHEDULED
Status: DISCONTINUED | OUTPATIENT
Start: 2023-08-10 | End: 2023-08-11 | Stop reason: HOSPADM

## 2023-08-10 RX ORDER — ATORVASTATIN CALCIUM 20 MG/1
20 TABLET, FILM COATED ORAL DAILY
Status: DISCONTINUED | OUTPATIENT
Start: 2023-08-10 | End: 2023-08-11 | Stop reason: HOSPADM

## 2023-08-10 RX ADMIN — ACETAMINOPHEN 650 MG: 325 TABLET ORAL at 16:05

## 2023-08-10 RX ADMIN — CEFAZOLIN SODIUM 2000 MG: 100 INJECTION, POWDER, LYOPHILIZED, FOR SOLUTION INTRAVENOUS at 16:07

## 2023-08-10 RX ADMIN — ATORVASTATIN CALCIUM 20 MG: 20 TABLET, FILM COATED ORAL at 12:47

## 2023-08-10 RX ADMIN — SODIUM CHLORIDE, PRESERVATIVE FREE 10 ML: 5 INJECTION INTRAVENOUS at 21:00

## 2023-08-10 RX ADMIN — SODIUM CHLORIDE, PRESERVATIVE FREE 10 ML: 5 INJECTION INTRAVENOUS at 12:47

## 2023-08-10 ASSESSMENT — PAIN DESCRIPTION - LOCATION: LOCATION: INCISION

## 2023-08-10 ASSESSMENT — PAIN SCALES - GENERAL
PAINLEVEL_OUTOF10: 0
PAINLEVEL_OUTOF10: 4
PAINLEVEL_OUTOF10: 0

## 2023-08-10 ASSESSMENT — PAIN DESCRIPTION - ORIENTATION: ORIENTATION: LEFT

## 2023-08-10 ASSESSMENT — PAIN DESCRIPTION - DESCRIPTORS: DESCRIPTORS: SORE;ACHING

## 2023-08-10 ASSESSMENT — PAIN - FUNCTIONAL ASSESSMENT: PAIN_FUNCTIONAL_ASSESSMENT: ACTIVITIES ARE NOT PREVENTED

## 2023-08-10 NOTE — PLAN OF CARE
Problem: Discharge Planning  Goal: Discharge to home or other facility with appropriate resources  Outcome: Progressing  Flowsheets (Taken 8/10/2023 1200)  Discharge to home or other facility with appropriate resources:   Identify barriers to discharge with patient and caregiver   Arrange for needed discharge resources and transportation as appropriate   Identify discharge learning needs (meds, wound care, etc)   Refer to discharge planning if patient needs post-hospital services based on physician order or complex needs related to functional status, cognitive ability or social support system     Problem: Pain  Goal: Verbalizes/displays adequate comfort level or baseline comfort level  Outcome: Progressing     Problem: Safety - Adult  Goal: Free from fall injury  Outcome: Progressing

## 2023-08-10 NOTE — CARE COORDINATION
Patient admitted in OP status for scheduled PPI to treat sinus node dysfunction. CM scanned medical record for discharge needs. Data documented. CM will remain available to assist with identified discharge needs. 08/10/23 5181   Service Assessment   Patient Orientation Alert and Oriented   Cognition Alert   History Provided By Patient   Primary Caregiver Self   Accompanied By/Relationship Unknown   Support Systems Spouse/Significant Other;Family Members   Patient's Healthcare Decision Maker is: Legal Next of Kin   PCP Verified by CM Yes   Last Visit to PCP Within last 3 months   Prior Functional Level Independent in ADLs/IADLs   Current Functional Level Independent in ADLs/IADLs   Can patient return to prior living arrangement Yes   Ability to make needs known: Good   Family able to assist with home care needs: Yes   Would you like for me to discuss the discharge plan with any other family members/significant others, and if so, who? No   Financial Resources Medicare   Community Resources None   Social/Functional History   Lives With Spouse   Type of [address] None   Receives Help From Family   Mode of Transportation Car   Occupation Retired   Discharge Planning   Type of [address] Prior To Admission None   Potential Assistance Needed N/A   DME Ordered? No   Potential Assistance Purchasing Medications No   Type of Home Care Services None   Patient expects to be discharged to: [facility] Discharge   Transition of Care Consult (CM Consult) Discharge [facility] Discharge None   [facility] Information Provided?  No   Mode of Transport at Discharge   (Car)   Confirm Follow Up Transport Family

## 2023-08-10 NOTE — PROGRESS NOTES
TRANSFER - OUT REPORT:    Verbal report given to Meri Thakkar RN on Carlene Whiteside  being transferred to [facility] for routine progression of patient care       Report consisted of patient's Situation, Background, Assessment and   Recommendations(SBAR). Information from the following report(s) Nurse Handoff Report was reviewed with the receiving nurse.          [employer] pacemaker insertion with Dr Marga Valverde: DDDR   Left chest incision sutured and dermabond  Covered with aquacel  Versed 7mg  Fentanyl 100mcg  Ancef 2g

## 2023-08-10 NOTE — PROGRESS NOTES
Patient received to [facility] room # 14  Ambulatory from [facility]. Patient scheduled for PPM today with Dr Armando Husain. Procedure reviewed & questions answered, voiced good understanding consent obtained & placed on chart. All medications and medical history reviewed. Will prep patient per orders. Patient & family updated on plan of care. The patient has a fraility score of 3-MANAGING WELL, based on ambulation without assistance.

## 2023-08-11 VITALS
WEIGHT: 212.2 LBS | BODY MASS INDEX: 30.38 KG/M2 | OXYGEN SATURATION: 95 % | HEART RATE: 60 BPM | TEMPERATURE: 99.5 F | DIASTOLIC BLOOD PRESSURE: 69 MMHG | RESPIRATION RATE: 19 BRPM | SYSTOLIC BLOOD PRESSURE: 129 MMHG | HEIGHT: 70 IN

## 2023-08-11 PROCEDURE — 2580000003 HC RX 258: Performed by: INTERNAL MEDICINE

## 2023-08-11 PROCEDURE — 93280 PM DEVICE PROGR EVAL DUAL: CPT | Performed by: INTERNAL MEDICINE

## 2023-08-11 PROCEDURE — 6370000000 HC RX 637 (ALT 250 FOR IP): Performed by: INTERNAL MEDICINE

## 2023-08-11 PROCEDURE — 6360000002 HC RX W HCPCS: Performed by: INTERNAL MEDICINE

## 2023-08-11 PROCEDURE — 6370000000 HC RX 637 (ALT 250 FOR IP): Performed by: NURSE PRACTITIONER

## 2023-08-11 RX ADMIN — CEFAZOLIN SODIUM 2000 MG: 100 INJECTION, POWDER, LYOPHILIZED, FOR SOLUTION INTRAVENOUS at 00:49

## 2023-08-11 RX ADMIN — CEFAZOLIN SODIUM 2000 MG: 100 INJECTION, POWDER, LYOPHILIZED, FOR SOLUTION INTRAVENOUS at 08:33

## 2023-08-11 RX ADMIN — LOSARTAN POTASSIUM 50 MG: 50 TABLET, FILM COATED ORAL at 08:33

## 2023-08-11 RX ADMIN — SODIUM CHLORIDE, PRESERVATIVE FREE 10 ML: 5 INJECTION INTRAVENOUS at 08:40

## 2023-08-11 RX ADMIN — ACETAMINOPHEN 650 MG: 325 TABLET ORAL at 00:49

## 2023-08-11 RX ADMIN — ASPIRIN 81 MG 81 MG: 81 TABLET ORAL at 08:33

## 2023-08-11 RX ADMIN — ACETAMINOPHEN 650 MG: 325 TABLET ORAL at 08:39

## 2023-08-11 RX ADMIN — ATORVASTATIN CALCIUM 20 MG: 20 TABLET, FILM COATED ORAL at 08:33

## 2023-08-11 ASSESSMENT — PAIN DESCRIPTION - DESCRIPTORS
DESCRIPTORS: ACHING;SORE
DESCRIPTORS: ACHING;SORE

## 2023-08-11 ASSESSMENT — PAIN SCALES - GENERAL
PAINLEVEL_OUTOF10: 3
PAINLEVEL_OUTOF10: 0

## 2023-08-11 ASSESSMENT — PAIN DESCRIPTION - ORIENTATION
ORIENTATION: LEFT
ORIENTATION: LEFT

## 2023-08-11 ASSESSMENT — PAIN DESCRIPTION - LOCATION
LOCATION: INCISION
LOCATION: INCISION

## 2023-08-11 ASSESSMENT — PAIN - FUNCTIONAL ASSESSMENT: PAIN_FUNCTIONAL_ASSESSMENT: PREVENTS OR INTERFERES SOME ACTIVE ACTIVITIES AND ADLS

## 2023-08-11 NOTE — DISCHARGE SUMMARY
[facility] Discharge Summary     Patient ID:  Cameron Sarmiento  [id number]  10 y.o.  1945    Admit date: 8/10/2023    Discharge date:  8/11/2023    Admitting Physician: Hawa Gonzales MD     Discharge Physician: Dr. Charla Prather    Admission Diagnoses: Chronotropic incompetence with sinus node dysfunction ([address]) [I49.5]  Sinus node dysfunction (HCC) [I49.5]  S/P placement of cardiac pacemaker [Z95.0]    Discharge Diagnoses:   Patient Active Problem List    Diagnosis    Sinus node dysfunction ([address])    S/P placement of cardiac pacemaker    Chronotropic incompetence with sinus node dysfunction [facility])     Cardiology Procedures this admission:   pacemaker implantation, CXR  Consults: none    Hospital Course: Patient was seen at the office of [facility] Cardiology by Dr. Haresh Garcia for management of symptomatic bradycardia and was subsequently scheduled for an AM Admission PM implantation at [facility] on 8/10/23. Patient was taken to the EP lab and underwent successful implantation of Medtronic dual chamber PM by Dr. Charla Prather. Patient tolerated the procedure well and was taken to the telemetry floor for recovery. Follow up chest xray showed no pneumothorax. The following morning patient was up feeling well without any complaints of chest pain, shortness of breath, or palpitations. Patient's left subclavian cath site was clean, dry and intact without hematoma. Patient's labs were WNL except cr 1.7. Patient was seen and examined by Dr. Charla Prather and determined stable and ready for discharge. Patient was instructed on the importance of medication compliance and outpatient follow up. Patient has been scheduled for follow-up with [facility]iology -- device clinic Aug 24 at [address] [place] [address]. Pt to hydrate and f/u w PCP re increased cr. 10 minutes spent discussing hospital course and discharge instructions. All questions answered.  Given printed scripts/escribed scripts for new meds, refills on all 9.5 8.3 - 10.4 MG/DL   CBC    Collection Time: 08/10/23  7:43 AM   Result Value Ref Range    WBC 5.8 4.3 - 11.1 K/uL    RBC 4.01 (L) 4.23 - 5.6 M/uL    Hemoglobin 12.6 (L) 13.6 - 17.2 g/dL    Hematocrit 37.8 (L) 41.1 - 50.3 %    MCV 94.3 82 - 102 FL    MCH 31.4 26.1 - 32.9 PG    MCHC 33.3 31.4 - 35.0 g/dL    RDW 12.0 11.9 - 14.6 %    Platelets 367 (L) 215 - 450 K/uL    MPV 10.4 9.4 - 12.3 FL    nRBC 0.00 0.0 - 0.2 K/uL   Magnesium    Collection Time: 08/10/23  7:43 AM   Result Value Ref Range    Magnesium 2.5 (H) 1.8 - 2.4 mg/dL   Electrophysiology procedure    Collection Time: 08/10/23 10:36 AM   Result Value Ref Range    Body Surface Area 2.18 m2         Patient Instructions:      Medication List        CONTINUE taking these medications      aspirin 81 MG chewable tablet     atorvastatin 20 MG tablet  Commonly known as: LIPITOR     cimetidine 200 MG tablet  Commonly known as: TAGAMET     fexofenadine 180 MG tablet  Commonly known as: ALLEGRA     fluticasone 50 MCG/ACT nasal spray  Commonly known as: FLONASE     MULTI FOR HIM 50+ PO     nitroGLYCERIN 0.4 MG SL tablet  Commonly known as: NITROSTAT  Place one SL under the tongue q 5 min prn cp, max 3 SL in a 15-min time period.  Call 911 if no relief after the 1st SL     olmesartan 20 MG tablet  Commonly known as: BENICAR     triamterene-hydroCHLOROthiazide 37.5-25 MG per capsule  Commonly known as: Ann Marie Simms

## 2023-08-11 NOTE — CARE COORDINATION
CM met with patient and his spouse this AM. No discharge needs identified. Spouse transporting home. Disposition: Home with family assistance.

## 2023-08-11 NOTE — DISCHARGE INSTRUCTIONS
DEVICE IMPLANT FOLLOWUP INSTRUCTIONS  You will be discharged with an occlusive dressing over the incision site. This dressing will be removed at the 10 -14-day postop site check with the [address]. Your new device will be checked at that visit and all your device teaching will be given. Keep the incision site dry until your follow up. Use a hand-held shower or bath. Do not submerge or soak in pools or tubs for 6 weeks. If you are discharged with a prescription for antibiotics, please take the full prescription until it is gone. After your site check, you may shower and get the incision site wet. You may let soap and water run on the incision and pat dry. Please do not apply creams, lotions or powders on or near the incision. Mild bruising and swelling can be normal after implant and will resolve in a few weeks. Call the office at [phone] if you have any of the following:  -Signs of infection, such as fever over 100 F, drainage from the incision, redness, significant swelling, or warmth at the incision site.  -Significant pain around the site that gets worse. Mild discomfort can be normal.   -Bleeding from the incision site  -Swelling in the arm on the side of the incision site  -Chest pain or shortness of breath     Your device has the capability of transmitting device information from home to our office using a home monitoring system or phone ibis. The information will transmit through a cellular connection outside of your home. Your device data is reviewed during working hours to ensure proper device function. You will be given your equipment and instruction upon your hospital discharge.                                                                       -You will be given a sling to wear upon discharge with instructions when it should be worn.    -Do not lift the affected arm above the shoulder level, on the side the device was put in, Device [facility] [phone]      Learning About a Pacemaker  What is a pacemaker? A pacemaker is a small device. It sends out mild electrical signals that keep your heart beating normally. The signals are painless. It can help stop the dizziness, fainting, and shortness of breath caused by a slow or unsteady heartbeat. A pacemaker is powered by batteries. Most pacemakers are placed under the skin of your chest. They have thin wires, called leads. The leads pass through a vein into your heart. A pacemaker can help restore a normal heart rate. It is used when certain problems have damaged the heart's electrical system, which normally keeps your heart beating steadily. If you are worried about having a pacemaker, it may help if you learn about how the pacemaker helps your heart. Talk to your doctor about your concerns. How is a pacemaker put in place? You will get medicine before the procedure. It helps you relax and helps prevent pain. The doctor makes a cut in the skin just below your collarbone. The cut may be on either side of your chest. The doctor will put the pacemaker leads through the cut. The leads go into a large blood vessel in the upper chest. Then the doctor will guide the leads through the blood vessel into the heart. The leads are placed in one or two of the chambers in the heart. The doctor will place the pacemaker under the skin of your chest. The doctor will attach the leads to the pacemaker. Then the cut will be closed. What can you expect when you have a pacemaker? A pacemaker can help you return to a more normal, more active life. You'll need to use certain electric devices with caution. Some devices have a strong electromagnetic field. This field can keep your pacemaker from working right for a short time. These devices include things in your home, garage, or workplace.  Check with your doctor about what you need to avoid and what you need to keep a short distance away from your

## 2023-08-24 ENCOUNTER — NURSE ONLY (OUTPATIENT)
Age: 78
End: 2023-08-24

## 2023-08-24 DIAGNOSIS — I49.5 CHRONOTROPIC INCOMPETENCE WITH SINUS NODE DYSFUNCTION (HCC): Primary | ICD-10-CM

## 2023-11-27 ENCOUNTER — TELEPHONE (OUTPATIENT)
Age: 78
End: 2023-11-27

## 2023-11-27 DIAGNOSIS — I48.0 PAROXYSMAL ATRIAL FIBRILLATION (HCC): Primary | ICD-10-CM

## 2023-11-27 NOTE — TELEPHONE ENCOUNTER
Patient with recent episodes of AF. Longest event lasting 41 minutes on 10/5. Full report available in [facility].

## 2023-11-27 NOTE — TELEPHONE ENCOUNTER
Pt states the Eliquis Dr. Estrella Reis prescribed is too expensive and wonders if there's a cheaper alternative she could prescribe? Pt also wonders if he could  samples from the [facility] office instead?

## 2023-11-27 NOTE — TELEPHONE ENCOUNTER
I spoke with the patient, he will  a 30 day voucher and list of [address] to check costs. Placed at check in, [place] office.

## 2023-11-27 NOTE — TELEPHONE ENCOUNTER
MD Lottie Moore  Cc: Lona Stallworth RN  Caller: Unspecified (Today,  8:16 AM)  Please notify patient, will need anticoagulation, recommend Eliquis 5 mg BID. He may schedule an in office visit to discuss if he would like or will discuss further at his upcoming visit in a couple of months, but would recommend anticoagulation now. thanks     Patient notified of Dr. Gage Castelan response and will start eliquis today.

## 2023-11-28 ENCOUNTER — TELEPHONE (OUTPATIENT)
Age: 78
End: 2023-11-28

## 2023-11-28 ENCOUNTER — NURSE ONLY (OUTPATIENT)
Age: 78
End: 2023-11-28

## 2023-11-28 DIAGNOSIS — I49.5 SINUS NODE DYSFUNCTION (HCC): Primary | ICD-10-CM

## 2023-11-28 DIAGNOSIS — I49.5 CHRONOTROPIC INCOMPETENCE WITH SINUS NODE DYSFUNCTION (HCC): ICD-10-CM

## 2023-11-28 NOTE — TELEPHONE ENCOUNTER
Patient would like to discuss the Eliquis he just started taking this morning. He is a patient of Dr Abhijit Boyer and has questions regarding Eliquis and possible alternatives. Please call patient on home number first and cell phone if can't reach at home. Thank you.

## 2023-11-28 NOTE — TELEPHONE ENCOUNTER
Patient notified of physician's response. He will be look into [employer], he does not prefer warfarin. Patient will follow-up with us accordingly once he has made a decision about obtaining an Eliquis long term prescription.

## 2023-12-12 ENCOUNTER — TELEPHONE (OUTPATIENT)
Age: 78
End: 2023-12-12

## 2023-12-12 NOTE — TELEPHONE ENCOUNTER
Pt calling to ask since starting eliquis should patient continue baby aspirin. Second can he get a 30 day free trial card. This will  carry him over till the new insurance starts in January.

## 2023-12-13 ENCOUNTER — TELEPHONE (OUTPATIENT)
Age: 78
End: 2023-12-13

## 2023-12-13 DIAGNOSIS — I48.0 PAROXYSMAL ATRIAL FIBRILLATION (HCC): ICD-10-CM

## 2023-12-13 NOTE — TELEPHONE ENCOUNTER
MEDICATION REFILL REQUEST      Name of Medication:  eliquis   Dose:  5 mg  Frequency:  twice a day   Quantity:    Days' supply:  80      Pharmacy Name/Location:  [facility]/ Please call in  today please / ID  # [id number]  fax: [phone]

## 2023-12-13 NOTE — TELEPHONE ENCOUNTER
Per Dr Rhina Can \"Ok to stop aspirin. \"   2 boxes of Eliquis 5mg left at the  for him, pt voiced understanding,

## 2023-12-18 PROCEDURE — 93296 REM INTERROG EVL PM/IDS: CPT | Performed by: INTERNAL MEDICINE

## 2023-12-18 PROCEDURE — 93294 REM INTERROG EVL PM/LDLS PM: CPT | Performed by: INTERNAL MEDICINE

## 2024-02-07 PROBLEM — I48.0 PAROXYSMAL ATRIAL FIBRILLATION (HCC): Status: ACTIVE | Noted: 2024-02-07

## 2024-02-07 NOTE — PROGRESS NOTES
cancer 2010    basal cell     Past Surgical History:   Procedure Laterality Date    APPENDECTOMY      CABG, VEIN, TWO  2011    CARDIAC CATHETERIZATION      CHOLECYSTECTOMY      EP DEVICE PROCEDURE N/A 8/10/2023    Insert PPM dual performed by Kevin Ahmadi MD at [facility] CARDIAC CATH LAB    NM UNLISTED PROCEDURE CARDIAC SURGERY      cabg x 3 2011     Family History   Problem Relation Age of Onset    Heart Disease Mother     Heart Disease Maternal Uncle     Cancer Father     Heart Disease Maternal Grandfather      Social History     Tobacco Use    Smoking status: Former     Current packs/day: 0.00     Types: Cigarettes     Quit date: 1984     Years since quittin.1    Smokeless tobacco: Never   Substance Use Topics    Alcohol use: Yes     Alcohol/week: 1.0 standard drink of alcohol       ROS:    Review of Systems   Cardiovascular:  Negative for chest pain and palpitations.   Respiratory:  Negative for shortness of breath.           PHYSICAL EXAM:   /68   Pulse 76   Ht 1.778 m (5' 10\")   Wt 101.6 kg (224 lb)   BMI 32.14 kg/m²      Wt Readings from Last 3 Encounters:   24 101.6 kg (224 lb)   23 96.3 kg (212 lb 3.2 oz)   23 96.2 kg (212 lb)     BP Readings from Last 3 Encounters:   24 130/68   23 129/69   23 132/70     Pulse Readings from Last 3 Encounters:   24 76   23 60   23 70           Physical Exam  Constitutional:       Appearance: Normal appearance.   HENT:      Head: Normocephalic and atraumatic.   Eyes:      Conjunctiva/sclera: Conjunctivae normal.   Neck:      Vascular: No carotid bruit.   Cardiovascular:      Rate and Rhythm: Normal rate and regular rhythm.      Heart sounds: No murmur heard.     No friction rub. No gallop.   Pulmonary:      Effort: No respiratory distress.      Breath sounds: No wheezing or rales.   Musculoskeletal:         General: No swelling.      Cervical back: Neck supple.   Skin:     General:

## 2024-02-08 ENCOUNTER — OFFICE VISIT (OUTPATIENT)
Age: 79
End: 2024-02-08
Payer: COMMERCIAL

## 2024-02-08 VITALS
WEIGHT: 224 LBS | BODY MASS INDEX: 32.07 KG/M2 | HEART RATE: 76 BPM | SYSTOLIC BLOOD PRESSURE: 130 MMHG | DIASTOLIC BLOOD PRESSURE: 68 MMHG | HEIGHT: 70 IN

## 2024-02-08 DIAGNOSIS — I25.118 CORONARY ARTERY DISEASE OF NATIVE ARTERY OF NATIVE HEART WITH STABLE ANGINA PECTORIS (HCC): ICD-10-CM

## 2024-02-08 DIAGNOSIS — I49.5 SINUS NODE DYSFUNCTION (HCC): Primary | ICD-10-CM

## 2024-02-08 DIAGNOSIS — Z95.1 S/P CABG X 2: ICD-10-CM

## 2024-02-08 DIAGNOSIS — Z95.0 CARDIAC PACEMAKER: ICD-10-CM

## 2024-02-08 DIAGNOSIS — I48.0 PAROXYSMAL ATRIAL FIBRILLATION (HCC): ICD-10-CM

## 2024-02-08 PROCEDURE — 1123F ACP DISCUSS/DSCN MKR DOCD: CPT | Performed by: INTERNAL MEDICINE

## 2024-02-08 PROCEDURE — 3075F SYST BP GE 130 - 139MM HG: CPT | Performed by: INTERNAL MEDICINE

## 2024-02-08 PROCEDURE — 99214 OFFICE O/P EST MOD 30 MIN: CPT | Performed by: INTERNAL MEDICINE

## 2024-02-08 PROCEDURE — 3078F DIAST BP <80 MM HG: CPT | Performed by: INTERNAL MEDICINE

## 2024-02-08 RX ORDER — NITROGLYCERIN 0.4 MG/1
TABLET SUBLINGUAL
Qty: 25 TABLET | Refills: 5 | Status: SHIPPED | OUTPATIENT
Start: 2024-02-08

## 2024-02-08 ASSESSMENT — ENCOUNTER SYMPTOMS: SHORTNESS OF BREATH: 0

## 2024-09-23 DIAGNOSIS — I48.0 PAROXYSMAL ATRIAL FIBRILLATION (HCC): ICD-10-CM

## 2024-10-10 ENCOUNTER — TELEPHONE (OUTPATIENT)
Age: 79
End: 2024-10-10

## 2024-10-10 NOTE — TELEPHONE ENCOUNTER
MEDICATION REFILL REQUEST      Name of Medication:  Apixaban  Dose:  5 mg  Frequency:  bid  Quantity:  180  Days' supply:  90 with 3 refills      Pharmacy Name/Location:  [id number]  [phone]  Please call in asap

## 2025-02-05 NOTE — PROGRESS NOTES
[facility] CARDIOLOGY  [address] [place] [address], [address]  [place] [address]  PHONE: [phone]      25    NAME:  Sven Mayorga  : 1945  MRN: [id number]         SUBJECTIVE:   Sven Mayorga is a 79 y.o. male seen for a follow up visit regarding the following:     Chief Complaint   Patient presents with    1 Year Follow Up    Atrial Fibrillation    Coronary Artery Disease            HPI:  Follow up  1 Year Follow Up, Atrial Fibrillation, and Coronary Artery Disease   .    Follow up CAD/CABG, dyslipidemia hypertension, GERD, symptomatic SN dysfunction and pacemaker via which asymptomatic  atrial fib was previously identified. He feels well.  Admits he's not getting much exercise.             Past cardiac history:   does not tolerate higher doses of statin.   LIMA to LAD, SVG to ramus, SVG to Cx, Dr. Jovan Sarmiento 11.  Preserved LV function   2016 - Cath - grafts patent   2018 - lexiscan MPI - normal perfusion and LVEF   Mar 2019 - Cath - patent graft.  2 small OM vessels. Non cardiac chest pain  2023       SR, SB. Avg 53, range 35 (sleep) - 99.  Several diary entries but no correlation with rates, some in the 60's others in the 40's or 50's. No pauses or high degree AV block. - OFF BB for this monitor       NST - normal perfusion and EF-chronotropic incompetence - achieved only 74% MPHR in stage 3.   Aug 2023       Medtronic dual chamber PPM - Dr Ahmadi  2023       Prolonged atrial fib noted on pacer check, rate controlled - Eliquis              Cardiac Medications       Diuretic Combinations       triamterene-hydroCHLOROthiazide (DYAZIDE) 37.5-25 MG per capsule Take 1 capsule by mouth daily       Nitrates       nitroGLYCERIN (NITROSTAT) 0.4 MG SL tablet Place one SL under the tongue q 5 min prn cp, max 3 SL in a 15-min time period. Call 911 if no relief after the 1st SL       HMG CoA Reductase Inhibitors       atorvastatin (LIPITOR) 20 MG tablet Take 1 tablet by

## 2025-02-07 ENCOUNTER — NURSE ONLY (OUTPATIENT)
Age: 80
End: 2025-02-07

## 2025-02-07 ENCOUNTER — OFFICE VISIT (OUTPATIENT)
Age: 80
End: 2025-02-07

## 2025-02-07 VITALS
SYSTOLIC BLOOD PRESSURE: 132 MMHG | DIASTOLIC BLOOD PRESSURE: 62 MMHG | BODY MASS INDEX: 31.21 KG/M2 | WEIGHT: 218 LBS | HEIGHT: 70 IN

## 2025-02-07 DIAGNOSIS — I49.5 SINUS NODE DYSFUNCTION (HCC): Primary | ICD-10-CM

## 2025-02-07 DIAGNOSIS — Z95.0 CARDIAC PACEMAKER: ICD-10-CM

## 2025-02-07 DIAGNOSIS — R00.1 BRADYCARDIA: ICD-10-CM

## 2025-02-07 DIAGNOSIS — Z95.1 S/P CABG X 2: ICD-10-CM

## 2025-02-07 DIAGNOSIS — I48.0 PAROXYSMAL ATRIAL FIBRILLATION (HCC): ICD-10-CM

## 2025-02-07 DIAGNOSIS — I49.5 SINUS NODE DYSFUNCTION (HCC): ICD-10-CM

## 2025-02-07 DIAGNOSIS — I48.0 PAROXYSMAL ATRIAL FIBRILLATION (HCC): Primary | ICD-10-CM

## 2025-02-07 DIAGNOSIS — I25.118 CORONARY ARTERY DISEASE OF NATIVE ARTERY OF NATIVE HEART WITH STABLE ANGINA PECTORIS (HCC): ICD-10-CM

## 2025-02-07 RX ORDER — NITROGLYCERIN 0.4 MG/1
TABLET SUBLINGUAL
Qty: 25 TABLET | Refills: 5 | Status: SHIPPED | OUTPATIENT
Start: 2025-02-07

## 2025-02-07 ASSESSMENT — ENCOUNTER SYMPTOMS: SHORTNESS OF BREATH: 0

## 2025-02-07 NOTE — PATIENT INSTRUCTIONS
CHANGE AT ANY AGE CAN POTENTIALLY INCREASE YOUR LIFE SPAN:    A sustained change from a typical Western to a predominantly plant based diet     At age 20 --Women may added on average 10.7 years, Men 13 years                         At age 60 --Women >= 8 years, Men >= 8.8 years                         At age 80--Women and Men > 3.4 years    The largest gains are seen from eating:    More legumes, whole grains, and nuts    Less red meat and processed meats    Google \"Food As Medicine Jumpstart\" for a free online pamphlet from the American College of Lifestyle Medicine:  https://lifestylemedicine.org/wp-content/uploads/2024/01/ACL-Food-As-Medicine-Jumpstart-8.5x11.pdf    Please visit www.cardiosmart.org for more information regarding cardiovascular disease prevention and treatment.  Patient Education        Walking for Exercise: Care Instructions  Overview     Walking is one of the easiest ways to get the exercise you need for good health. A brisk, 30-minute walk each day can help you feel better and have more energy. It can help you lower your risk of disease. Walking can help you keep your bones strong and your heart healthy.  Check with your doctor before you start a walking plan if you have heart problems, other health issues, or you have not been active in a long time. Follow your doctor's instructions for safe levels of exercise.  Follow-up care is a key part of your treatment and safety. Be sure to make and go to all appointments, and call your doctor if you are having problems. It's also a good idea to know your test results and keep a list of the medicines you take.  How can you care for yourself at home?  Getting started  Start slowly and set a short-term goal. For example, walk for 5 or 10 minutes every day.  Bit by bit, increase the amount you walk every day. Try for at least 30 minutes on most days of the week. You also may want to swim, bike, or do other activities.  If finding enough time is a

## 2025-02-17 ENCOUNTER — APPOINTMENT (OUTPATIENT)
Dept: GENERAL RADIOLOGY | Age: 80
End: 2025-02-17
Payer: COMMERCIAL

## 2025-02-17 ENCOUNTER — HOSPITAL ENCOUNTER (EMERGENCY)
Age: 80
Discharge: HOME | End: 2025-02-18
Attending: EMERGENCY MEDICINE
Payer: COMMERCIAL

## 2025-02-17 DIAGNOSIS — R07.89 CHEST TIGHTNESS: Primary | ICD-10-CM

## 2025-02-17 LAB
ALBUMIN SERPL-MCNC: 4 G/DL (ref 3.2–4.6)
ALBUMIN/GLOB SERPL: 1.3 (ref 1–1.9)
ALP SERPL-CCNC: 84 U/L (ref 40–129)
ALT SERPL-CCNC: 25 U/L (ref 8–55)
ANION GAP SERPL CALC-SCNC: 10 MMOL/L (ref 7–16)
AST SERPL-CCNC: 23 U/L (ref 15–37)
BASOPHILS # BLD: 0.01 K/UL (ref 0–0.2)
BASOPHILS NFR BLD: 0.2 % (ref 0–2)
BILIRUB SERPL-MCNC: 1.6 MG/DL (ref 0–1.2)
BUN SERPL-MCNC: 19 MG/DL (ref 8–23)
CALCIUM SERPL-MCNC: 9.1 MG/DL (ref 8.8–10.2)
CHLORIDE SERPL-SCNC: 102 MMOL/L (ref 98–107)
CO2 SERPL-SCNC: 26 MMOL/L (ref 20–29)
CREAT SERPL-MCNC: 1.27 MG/DL (ref 0.8–1.3)
DIFFERENTIAL METHOD BLD: ABNORMAL
EOSINOPHIL # BLD: 0.12 K/UL (ref 0–0.8)
EOSINOPHIL NFR BLD: 2.2 % (ref 0.5–7.8)
ERYTHROCYTE [DISTWIDTH] IN BLOOD BY AUTOMATED COUNT: 12.2 % (ref 11.9–14.6)
GLOBULIN SER CALC-MCNC: 3.1 G/DL (ref 2.3–3.5)
GLUCOSE SERPL-MCNC: 115 MG/DL (ref 70–99)
HCT VFR BLD AUTO: 35.9 % (ref 41.1–50.3)
HGB BLD-MCNC: 12.7 G/DL (ref 13.6–17.2)
IMM GRANULOCYTES # BLD AUTO: 0.02 K/UL (ref 0–0.5)
IMM GRANULOCYTES NFR BLD AUTO: 0.4 % (ref 0–5)
LIPASE SERPL-CCNC: 27 U/L (ref 13–60)
LYMPHOCYTES # BLD: 1.14 K/UL (ref 0.5–4.6)
LYMPHOCYTES NFR BLD: 20.7 % (ref 13–44)
MCH RBC QN AUTO: 31.9 PG (ref 26.1–32.9)
MCHC RBC AUTO-ENTMCNC: 35.4 G/DL (ref 31.4–35)
MCV RBC AUTO: 90.2 FL (ref 82–102)
MONOCYTES # BLD: 0.51 K/UL (ref 0.1–1.3)
MONOCYTES NFR BLD: 9.2 % (ref 4–12)
NEUTS SEG # BLD: 3.72 K/UL (ref 1.7–8.2)
NEUTS SEG NFR BLD: 67.3 % (ref 43–78)
NRBC # BLD: 0 K/UL (ref 0–0.2)
PLATELET # BLD AUTO: 135 K/UL (ref 150–450)
PMV BLD AUTO: 10 FL (ref 9.4–12.3)
POTASSIUM SERPL-SCNC: 3.8 MMOL/L (ref 3.5–5.1)
PROT SERPL-MCNC: 7.1 G/DL (ref 6.3–8.2)
RBC # BLD AUTO: 3.98 M/UL (ref 4.23–5.6)
SODIUM SERPL-SCNC: 139 MMOL/L (ref 136–145)
TROPONIN T SERPL HS-MCNC: 9 NG/L (ref 0–22)
TROPONIN T SERPL HS-MCNC: 9 NG/L (ref 0–22)
WBC # BLD AUTO: 5.5 K/UL (ref 4.3–11.1)

## 2025-02-17 PROCEDURE — 83690 ASSAY OF LIPASE: CPT

## 2025-02-17 PROCEDURE — 71046 X-RAY EXAM CHEST 2 VIEWS: CPT

## 2025-02-17 PROCEDURE — 80053 COMPREHEN METABOLIC PANEL: CPT

## 2025-02-17 PROCEDURE — 93005 ELECTROCARDIOGRAM TRACING: CPT | Performed by: EMERGENCY MEDICINE

## 2025-02-17 PROCEDURE — 6370000000 HC RX 637 (ALT 250 FOR IP): Performed by: EMERGENCY MEDICINE

## 2025-02-17 PROCEDURE — 85025 COMPLETE CBC W/AUTO DIFF WBC: CPT

## 2025-02-17 PROCEDURE — 84484 ASSAY OF TROPONIN QUANT: CPT

## 2025-02-17 PROCEDURE — 99285 EMERGENCY DEPT VISIT HI MDM: CPT

## 2025-02-17 RX ORDER — MAGNESIUM HYDROXIDE/ALUMINUM HYDROXICE/SIMETHICONE 120; 1200; 1200 MG/30ML; MG/30ML; MG/30ML
30 SUSPENSION ORAL
Status: COMPLETED | OUTPATIENT
Start: 2025-02-17 | End: 2025-02-17

## 2025-02-17 RX ORDER — ASPIRIN 81 MG/1
324 TABLET, CHEWABLE ORAL
Status: COMPLETED | OUTPATIENT
Start: 2025-02-17 | End: 2025-02-17

## 2025-02-17 RX ORDER — LIDOCAINE HYDROCHLORIDE 20 MG/ML
15 SOLUTION OROPHARYNGEAL
Status: COMPLETED | OUTPATIENT
Start: 2025-02-17 | End: 2025-02-17

## 2025-02-17 RX ADMIN — ASPIRIN 324 MG: 81 TABLET, CHEWABLE ORAL at 22:36

## 2025-02-17 RX ADMIN — ALUMINUM HYDROXIDE, MAGNESIUM HYDROXIDE, AND SIMETHICONE 30 ML: 200; 200; 20 SUSPENSION ORAL at 22:35

## 2025-02-17 RX ADMIN — LIDOCAINE HYDROCHLORIDE 15 ML: 20 SOLUTION ORAL at 22:35

## 2025-02-17 ASSESSMENT — PAIN DESCRIPTION - DESCRIPTORS
DESCRIPTORS: TIGHTNESS
DESCRIPTORS: ACHING

## 2025-02-17 ASSESSMENT — ENCOUNTER SYMPTOMS
BACK PAIN: 0
SORE THROAT: 0
ABDOMINAL PAIN: 0
CONSTIPATION: 0
COUGH: 0
DIARRHEA: 0
COLOR CHANGE: 0
RHINORRHEA: 0
NAUSEA: 0
SHORTNESS OF BREATH: 0
VOMITING: 0

## 2025-02-17 ASSESSMENT — PAIN DESCRIPTION - ORIENTATION: ORIENTATION: LEFT

## 2025-02-17 ASSESSMENT — PAIN DESCRIPTION - LOCATION
LOCATION: CHEST
LOCATION: CHEST

## 2025-02-17 ASSESSMENT — PAIN SCALES - GENERAL
PAINLEVEL_OUTOF10: 2
PAINLEVEL_OUTOF10: 5

## 2025-02-17 ASSESSMENT — PAIN - FUNCTIONAL ASSESSMENT: PAIN_FUNCTIONAL_ASSESSMENT: 0-10

## 2025-02-18 VITALS
RESPIRATION RATE: 13 BRPM | SYSTOLIC BLOOD PRESSURE: 135 MMHG | DIASTOLIC BLOOD PRESSURE: 79 MMHG | OXYGEN SATURATION: 96 % | WEIGHT: 213 LBS | TEMPERATURE: 98.4 F | BODY MASS INDEX: 30.49 KG/M2 | HEIGHT: 70 IN | HEART RATE: 60 BPM

## 2025-02-18 LAB
EKG ATRIAL RATE: 73 BPM
EKG DIAGNOSIS: NORMAL
EKG P AXIS: 8 DEGREES
EKG P-R INTERVAL: 160 MS
EKG Q-T INTERVAL: 390 MS
EKG QRS DURATION: 100 MS
EKG QTC CALCULATION (BAZETT): 429 MS
EKG R AXIS: -16 DEGREES
EKG T AXIS: 41 DEGREES
EKG VENTRICULAR RATE: 73 BPM

## 2025-02-18 PROCEDURE — 93010 ELECTROCARDIOGRAM REPORT: CPT | Performed by: INTERNAL MEDICINE

## 2025-02-18 NOTE — ED TRIAGE NOTES
C/o chest tighteness on L side and radiates into arm, started tonight and woke out of sleep. Progressively worse over the day. Bypass was in 2010 and pacemaker placed in 2024. Diaphoresis, denies nausea or vomiting.

## 2025-02-18 NOTE — ED PROVIDER NOTES
no abdominal tenderness.   Musculoskeletal:         General: No swelling. Normal range of motion.      Cervical back: Normal range of motion. No tenderness.   Skin:     General: Skin is warm and dry.   Neurological:      Mental Status: He is alert.        Procedures     Procedures    Orders placed during this emergency department visit:     Orders Placed This Encounter   Procedures    XR CHEST (2 VW)    CBC with Auto Differential    Comprehensive Metabolic Panel w/ Reflex to MG    Troponin now then q90 min for 2 occurances    Troponin    Lipase    Cardiac Monitor - ED Only “IF” in treatment area.    EKG 12 Lead    Saline lock IV “IF” in treatment area.        Medications given during this emergency department visit:     Medications   aspirin chewable tablet 324 mg (324 mg Oral Given 2/17/25 2236)   aluminum & magnesium hydroxide-simethicone (MAALOX PLUS) 200-200-20 MG/5ML suspension 30 mL (30 mLs Oral Given 2/17/25 2235)   lidocaine viscous hcl (XYLOCAINE) 2 % solution 15 mL (15 mLs Mouth/Throat Given 2/17/25 2235)       New prescriptions:     New Prescriptions    No medications on file        Past History and Complexity:     Past Medical History:   Diagnosis Date    CAD (coronary artery disease)     diagnosed in 1980s    Cancer (HCC)     skin - right hand    Dyslipidemia     ED (erectile dysfunction)     Gastrointestinal disorder     GERD (gastroesophageal reflux disease)     Hypertension     Skin cancer 2010    basal cell        Past Surgical History:   Procedure Laterality Date    APPENDECTOMY      CABG, VEIN, TWO  4/5/2011    CARDIAC CATHETERIZATION      CHOLECYSTECTOMY      EP DEVICE PROCEDURE N/A 8/10/2023    Insert PPM dual performed by Kevin Ahmadi MD at [facility] CARDIAC CATH LAB    [facility] UNLISTED PROCEDURE CARDIAC SURGERY      cabg x 3 april 5, 2011        Social History     Socioeconomic History    Marital status:    Tobacco Use    Smoking status: Former     Current packs/day: 0.00     Types:

## 2025-02-24 NOTE — PROGRESS NOTES
[facility] CARDIOLOGY  [address] [place] [address], [address]  [place] [address]  PHONE: [phone]      25    NAME:  Sven Mayorga  : 1945  MRN: [id number]         SUBJECTIVE:   Sven Mayorga is a 79 y.o. male seen for a follow up visit regarding the following:     Chief Complaint   Patient presents with    Follow-up    Hypertension    Coronary Artery Disease    Atrial Fibrillation            HPI:  Follow up  Follow-up, Hypertension, Coronary Artery Disease, and Atrial Fibrillation   .    Follow up CAD/CABG, dyslipidemia hypertension, GERD, symptomatic SN dysfunction and pacemaker via which asymptomatic  atrial fib was previously identified. ER visit  with day long left arm numbness and discomfort, progressing to chest discomfort the following day,  negative evaluation. Treated with antacid/GI cocktail and ASA.     He admits he had eaten a lot of spicy foods prior to it.  He is feeling better.  He's also been eating cookies his wife makes, has gained ~ 5 lbs.  He is getting outside more that the weather is better.           Past cardiac history:   does not tolerate higher doses of statin.   LIMA to LAD, SVG to ramus, SVG to Cx, Dr. Jovan Sarmiento 11.  Preserved LV function   2016 - Cath - grafts patent   2018 - lexiscan MPI - normal perfusion and LVEF   Mar 2019 - Cath - patent graft.  2 small OM vessels. Non cardiac chest pain  2023       SR, SB. Avg 53, range 35 (sleep) - 99.  Several diary entries but no correlation with rates, some in the 60's others in the 40's or 50's. No pauses or high degree AV block. - OFF BB for this monitor       NST - normal perfusion and EF-chronotropic incompetence - achieved only 74% MPHR in stage 3.   Aug 2023       Medtronic dual chamber PPM - Dr Ahmadi  2023       Prolonged atrial fib noted on pacer check, rate controlled - Eliquis                Cardiac Medications       Diuretic Combinations

## 2025-02-25 ENCOUNTER — OFFICE VISIT (OUTPATIENT)
Age: 80
End: 2025-02-25

## 2025-02-25 VITALS
DIASTOLIC BLOOD PRESSURE: 60 MMHG | HEIGHT: 70 IN | BODY MASS INDEX: 31.5 KG/M2 | WEIGHT: 220 LBS | HEART RATE: 80 BPM | SYSTOLIC BLOOD PRESSURE: 122 MMHG

## 2025-02-25 DIAGNOSIS — R07.89 CHEST DISCOMFORT: Primary | ICD-10-CM

## 2025-02-25 DIAGNOSIS — I25.118 CORONARY ARTERY DISEASE OF NATIVE ARTERY OF NATIVE HEART WITH STABLE ANGINA PECTORIS: ICD-10-CM

## 2025-02-25 DIAGNOSIS — I48.0 PAROXYSMAL ATRIAL FIBRILLATION (HCC): ICD-10-CM

## 2025-02-25 DIAGNOSIS — E78.5 DYSLIPIDEMIA: ICD-10-CM

## 2025-02-25 DIAGNOSIS — Z95.0 CARDIAC PACEMAKER: ICD-10-CM

## 2025-02-25 DIAGNOSIS — I10 ESSENTIAL HYPERTENSION: ICD-10-CM

## 2025-02-25 DIAGNOSIS — I49.5 SINUS NODE DYSFUNCTION (HCC): ICD-10-CM

## 2025-02-25 ASSESSMENT — ENCOUNTER SYMPTOMS: SHORTNESS OF BREATH: 0

## 2025-05-30 PROCEDURE — 93296 REM INTERROG EVL PM/IDS: CPT | Performed by: INTERNAL MEDICINE

## 2025-05-30 PROCEDURE — 93294 REM INTERROG EVL PM/LDLS PM: CPT | Performed by: INTERNAL MEDICINE

## 2025-08-14 ENCOUNTER — TELEPHONE (OUTPATIENT)
Age: 80
End: 2025-08-14

## (undated) DEVICE — SUTURE ABSORBABLE BRAIDED 2-0 CT-1 27 IN UD VICRYL J259H

## (undated) DEVICE — PLASMABLADE X PS210-030S-LIGHT 3.0SL: Brand: PLASMABLADE™ X

## (undated) DEVICE — HEMOSTAT TOP 5ML CLLGN THROM FLOWABLE SYR D-STAT

## (undated) DEVICE — SUTURE VCRL SZ 3-0 L27IN ABSRB UD L26MM SH 1/2 CIR J416H

## (undated) DEVICE — SUTURE VCRL + SZ 4-0 L27IN ABSRB UD PS-2 3/8 CIR REV CUT VCP426H

## (undated) DEVICE — X-RAY SPONGES,12 PLY: Brand: DERMACEA

## (undated) DEVICE — SUTURE TICRON SZ 0 L36IN NONABSORBABLE BLU CV 300 L35MM 1 2 8886339361

## (undated) DEVICE — IMMOBILIZER SHLDR M L8X16.5IN R/L CANVS W/ WAIST STRP THMB

## (undated) DEVICE — LIQUIBAND RAPID ADHESIVE 36/CS 0.8ML: Brand: MEDLINE

## (undated) DEVICE — INTRODUCER SHTH L13CM OD7FR SH ORNG HUB SEAMLESS SAFSHTH

## (undated) DEVICE — DRESSING POSTOP AG PRISMASEAL 3.5X6IN

## (undated) DEVICE — GUIDE NDL ST W/ 14 X 147CM TELESCOPICALLY FLD CIV FLX CVR

## (undated) DEVICE — SYSTEM SURG HEMSTAT PWD 1 GM POLYSACCHARIDE HEMOSPHERES